# Patient Record
Sex: MALE | Race: WHITE | NOT HISPANIC OR LATINO | Employment: OTHER | URBAN - NONMETROPOLITAN AREA
[De-identification: names, ages, dates, MRNs, and addresses within clinical notes are randomized per-mention and may not be internally consistent; named-entity substitution may affect disease eponyms.]

---

## 2021-12-16 ENCOUNTER — ANESTHESIA EVENT (OUTPATIENT)
Dept: PERIOP | Facility: HOSPITAL | Age: 86
End: 2021-12-16
Payer: MEDICARE

## 2021-12-16 RX ORDER — TAMSULOSIN HYDROCHLORIDE 0.4 MG/1
0.4 CAPSULE ORAL
COMMUNITY
End: 2022-06-11

## 2021-12-22 ENCOUNTER — ANESTHESIA (OUTPATIENT)
Dept: PERIOP | Facility: HOSPITAL | Age: 86
End: 2021-12-22
Payer: MEDICARE

## 2021-12-22 ENCOUNTER — HOSPITAL ENCOUNTER (OUTPATIENT)
Facility: HOSPITAL | Age: 86
Setting detail: OUTPATIENT SURGERY
Discharge: HOME | End: 2021-12-23
Attending: OTOLARYNGOLOGY | Admitting: OTOLARYNGOLOGY
Payer: MEDICARE

## 2021-12-22 DIAGNOSIS — E89.0 S/P THYROIDECTOMY: Primary | ICD-10-CM

## 2021-12-22 DIAGNOSIS — R91.1 PULMONARY NODULE: ICD-10-CM

## 2021-12-22 DIAGNOSIS — E04.1 NONTOXIC SINGLE THYROID NODULE: ICD-10-CM

## 2021-12-22 PROBLEM — N40.0 BPH (BENIGN PROSTATIC HYPERPLASIA): Status: ACTIVE | Noted: 2021-12-22

## 2021-12-22 LAB
ANION GAP SERPL CALCULATED.3IONS-SCNC: 10 MMOL/L (ref 4–13)
BASOPHILS # BLD MANUAL: 0 THOUSAND/UL (ref 0–0.1)
BASOPHILS NFR MAR MANUAL: 0 % (ref 0–1)
BUN SERPL-MCNC: 17 MG/DL (ref 5–25)
CALCIUM SERPL-MCNC: 8.4 MG/DL (ref 8.3–10.1)
CALCIUM SERPL-MCNC: 8.6 MG/DL (ref 8.3–10.1)
CHLORIDE SERPL-SCNC: 102 MMOL/L (ref 100–108)
CO2 SERPL-SCNC: 25 MMOL/L (ref 21–32)
CREAT SERPL-MCNC: 1.11 MG/DL (ref 0.6–1.3)
EOSINOPHIL # BLD MANUAL: 0 THOUSAND/UL (ref 0–0.4)
EOSINOPHIL NFR BLD MANUAL: 0 % (ref 0–6)
ERYTHROCYTE [DISTWIDTH] IN BLOOD BY AUTOMATED COUNT: 14.4 % (ref 11.6–15.1)
GFR SERPL CREATININE-BSD FRML MDRD: 57 ML/MIN/1.73SQ M
GLUCOSE P FAST SERPL-MCNC: 169 MG/DL (ref 65–99)
GLUCOSE SERPL-MCNC: 169 MG/DL (ref 65–140)
HCT VFR BLD AUTO: 39.6 % (ref 36.5–49.3)
HGB BLD-MCNC: 13.5 G/DL (ref 12–17)
LYMPHOCYTES # BLD AUTO: 0.19 THOUSAND/UL (ref 0.6–4.47)
LYMPHOCYTES # BLD AUTO: 2 % (ref 14–44)
MCH RBC QN AUTO: 30.4 PG (ref 26.8–34.3)
MCHC RBC AUTO-ENTMCNC: 34.1 G/DL (ref 31.4–37.4)
MCV RBC AUTO: 89 FL (ref 82–98)
MONOCYTES # BLD AUTO: 0 THOUSAND/UL (ref 0–1.22)
MONOCYTES NFR BLD: 0 % (ref 4–12)
NEUTROPHILS # BLD MANUAL: 9.55 THOUSAND/UL (ref 1.85–7.62)
NEUTS SEG NFR BLD AUTO: 98 % (ref 43–75)
PLATELET # BLD AUTO: 181 THOUSANDS/UL (ref 149–390)
PLATELET BLD QL SMEAR: ADEQUATE
PMV BLD AUTO: 9.8 FL (ref 8.9–12.7)
POTASSIUM SERPL-SCNC: 4.5 MMOL/L (ref 3.5–5.3)
RBC # BLD AUTO: 4.44 MILLION/UL (ref 3.88–5.62)
RBC MORPH BLD: NORMAL
SODIUM SERPL-SCNC: 137 MMOL/L (ref 136–145)
WBC # BLD AUTO: 9.74 THOUSAND/UL (ref 4.31–10.16)

## 2021-12-22 PROCEDURE — 85007 BL SMEAR W/DIFF WBC COUNT: CPT | Performed by: INTERNAL MEDICINE

## 2021-12-22 PROCEDURE — 85027 COMPLETE CBC AUTOMATED: CPT | Performed by: INTERNAL MEDICINE

## 2021-12-22 PROCEDURE — 88331 PATH CONSLTJ SURG 1 BLK 1SPC: CPT | Performed by: PATHOLOGY

## 2021-12-22 PROCEDURE — 88307 TISSUE EXAM BY PATHOLOGIST: CPT | Performed by: PATHOLOGY

## 2021-12-22 PROCEDURE — 80048 BASIC METABOLIC PNL TOTAL CA: CPT | Performed by: INTERNAL MEDICINE

## 2021-12-22 PROCEDURE — 82310 ASSAY OF CALCIUM: CPT | Performed by: OTOLARYNGOLOGY

## 2021-12-22 PROCEDURE — NC001 PR NO CHARGE

## 2021-12-22 RX ORDER — FENTANYL CITRATE 50 UG/ML
INJECTION, SOLUTION INTRAMUSCULAR; INTRAVENOUS AS NEEDED
Status: DISCONTINUED | OUTPATIENT
Start: 2021-12-22 | End: 2021-12-22

## 2021-12-22 RX ORDER — DEXAMETHASONE SODIUM PHOSPHATE 4 MG/ML
INJECTION, SOLUTION INTRA-ARTICULAR; INTRALESIONAL; INTRAMUSCULAR; INTRAVENOUS; SOFT TISSUE AS NEEDED
Status: DISCONTINUED | OUTPATIENT
Start: 2021-12-22 | End: 2021-12-22

## 2021-12-22 RX ORDER — ONDANSETRON 2 MG/ML
INJECTION INTRAMUSCULAR; INTRAVENOUS AS NEEDED
Status: DISCONTINUED | OUTPATIENT
Start: 2021-12-22 | End: 2021-12-22

## 2021-12-22 RX ORDER — MAGNESIUM HYDROXIDE 1200 MG/15ML
LIQUID ORAL AS NEEDED
Status: DISCONTINUED | OUTPATIENT
Start: 2021-12-22 | End: 2021-12-22 | Stop reason: HOSPADM

## 2021-12-22 RX ORDER — HYDROCODONE BITARTRATE AND ACETAMINOPHEN 5; 325 MG/1; MG/1
2 TABLET ORAL EVERY 6 HOURS PRN
Status: DISCONTINUED | OUTPATIENT
Start: 2021-12-22 | End: 2021-12-23 | Stop reason: HOSPADM

## 2021-12-22 RX ORDER — ONDANSETRON 2 MG/ML
4 INJECTION INTRAMUSCULAR; INTRAVENOUS EVERY 6 HOURS PRN
Status: DISCONTINUED | OUTPATIENT
Start: 2021-12-22 | End: 2021-12-23 | Stop reason: HOSPADM

## 2021-12-22 RX ORDER — DEXMEDETOMIDINE HYDROCHLORIDE 100 UG/ML
INJECTION, SOLUTION INTRAVENOUS AS NEEDED
Status: DISCONTINUED | OUTPATIENT
Start: 2021-12-22 | End: 2021-12-22

## 2021-12-22 RX ORDER — FENTANYL CITRATE/PF 50 MCG/ML
25 SYRINGE (ML) INJECTION
Status: DISCONTINUED | OUTPATIENT
Start: 2021-12-22 | End: 2021-12-22

## 2021-12-22 RX ORDER — CEFAZOLIN SODIUM 2 G/50ML
SOLUTION INTRAVENOUS AS NEEDED
Status: DISCONTINUED | OUTPATIENT
Start: 2021-12-22 | End: 2021-12-22

## 2021-12-22 RX ORDER — EPHEDRINE SULFATE 50 MG/ML
INJECTION INTRAVENOUS AS NEEDED
Status: DISCONTINUED | OUTPATIENT
Start: 2021-12-22 | End: 2021-12-22

## 2021-12-22 RX ORDER — SODIUM CHLORIDE, SODIUM LACTATE, POTASSIUM CHLORIDE, CALCIUM CHLORIDE 600; 310; 30; 20 MG/100ML; MG/100ML; MG/100ML; MG/100ML
125 INJECTION, SOLUTION INTRAVENOUS CONTINUOUS
Status: DISCONTINUED | OUTPATIENT
Start: 2021-12-22 | End: 2021-12-22 | Stop reason: DRUGHIGH

## 2021-12-22 RX ORDER — TAMSULOSIN HYDROCHLORIDE 0.4 MG/1
0.4 CAPSULE ORAL
Status: DISCONTINUED | OUTPATIENT
Start: 2021-12-22 | End: 2021-12-23 | Stop reason: HOSPADM

## 2021-12-22 RX ORDER — SODIUM CHLORIDE, SODIUM LACTATE, POTASSIUM CHLORIDE, CALCIUM CHLORIDE 600; 310; 30; 20 MG/100ML; MG/100ML; MG/100ML; MG/100ML
75 INJECTION, SOLUTION INTRAVENOUS CONTINUOUS
Status: DISCONTINUED | OUTPATIENT
Start: 2021-12-22 | End: 2021-12-23 | Stop reason: HOSPADM

## 2021-12-22 RX ORDER — ONDANSETRON 2 MG/ML
4 INJECTION INTRAMUSCULAR; INTRAVENOUS ONCE AS NEEDED
Status: DISCONTINUED | OUTPATIENT
Start: 2021-12-22 | End: 2021-12-22

## 2021-12-22 RX ORDER — SUCCINYLCHOLINE/SOD CL,ISO/PF 100 MG/5ML
SYRINGE (ML) INTRAVENOUS AS NEEDED
Status: DISCONTINUED | OUTPATIENT
Start: 2021-12-22 | End: 2021-12-22

## 2021-12-22 RX ORDER — LIDOCAINE HYDROCHLORIDE 10 MG/ML
INJECTION, SOLUTION EPIDURAL; INFILTRATION; INTRACAUDAL; PERINEURAL AS NEEDED
Status: DISCONTINUED | OUTPATIENT
Start: 2021-12-22 | End: 2021-12-22

## 2021-12-22 RX ORDER — CEFAZOLIN SODIUM 2 G/50ML
2000 SOLUTION INTRAVENOUS EVERY 8 HOURS
Status: COMPLETED | OUTPATIENT
Start: 2021-12-22 | End: 2021-12-23

## 2021-12-22 RX ORDER — PROPOFOL 10 MG/ML
INJECTION, EMULSION INTRAVENOUS AS NEEDED
Status: DISCONTINUED | OUTPATIENT
Start: 2021-12-22 | End: 2021-12-22

## 2021-12-22 RX ORDER — METOCLOPRAMIDE HYDROCHLORIDE 5 MG/ML
10 INJECTION INTRAMUSCULAR; INTRAVENOUS ONCE AS NEEDED
Status: DISCONTINUED | OUTPATIENT
Start: 2021-12-22 | End: 2021-12-22

## 2021-12-22 RX ADMIN — PROPOFOL 110 MG: 10 INJECTION, EMULSION INTRAVENOUS at 07:34

## 2021-12-22 RX ADMIN — SODIUM CHLORIDE, SODIUM LACTATE, POTASSIUM CHLORIDE, AND CALCIUM CHLORIDE 75 ML/HR: .6; .31; .03; .02 INJECTION, SOLUTION INTRAVENOUS at 13:10

## 2021-12-22 RX ADMIN — EPHEDRINE SULFATE 10 MG: 50 INJECTION, SOLUTION INTRAVENOUS at 08:43

## 2021-12-22 RX ADMIN — LIDOCAINE HYDROCHLORIDE 50 MG: 10 INJECTION, SOLUTION EPIDURAL; INFILTRATION; INTRACAUDAL at 07:34

## 2021-12-22 RX ADMIN — DEXAMETHASONE SODIUM PHOSPHATE 8 MG: 4 INJECTION, SOLUTION INTRA-ARTICULAR; INTRALESIONAL; INTRAMUSCULAR; INTRAVENOUS; SOFT TISSUE at 07:34

## 2021-12-22 RX ADMIN — DEXMEDETOMIDINE HCL 4 MCG: 100 INJECTION INTRAVENOUS at 07:59

## 2021-12-22 RX ADMIN — CEFAZOLIN SODIUM 2000 MG: 2 SOLUTION INTRAVENOUS at 21:55

## 2021-12-22 RX ADMIN — CEFAZOLIN SODIUM 2000 MG: 2 SOLUTION INTRAVENOUS at 14:03

## 2021-12-22 RX ADMIN — DEXMEDETOMIDINE HCL 8 MCG: 100 INJECTION INTRAVENOUS at 08:00

## 2021-12-22 RX ADMIN — CEFAZOLIN SODIUM 2000 MG: 2 SOLUTION INTRAVENOUS at 07:44

## 2021-12-22 RX ADMIN — PROPOFOL 30 MG: 10 INJECTION, EMULSION INTRAVENOUS at 07:58

## 2021-12-22 RX ADMIN — SODIUM CHLORIDE, SODIUM LACTATE, POTASSIUM CHLORIDE, AND CALCIUM CHLORIDE: .6; .31; .03; .02 INJECTION, SOLUTION INTRAVENOUS at 07:30

## 2021-12-22 RX ADMIN — Medication 100 MG: at 07:34

## 2021-12-22 RX ADMIN — FENTANYL CITRATE 50 MCG: 50 INJECTION INTRAMUSCULAR; INTRAVENOUS at 07:48

## 2021-12-22 RX ADMIN — FENTANYL CITRATE 25 MCG: 50 INJECTION INTRAMUSCULAR; INTRAVENOUS at 07:34

## 2021-12-22 RX ADMIN — ONDANSETRON 4 MG: 2 INJECTION INTRAMUSCULAR; INTRAVENOUS at 07:34

## 2021-12-22 RX ADMIN — FENTANYL CITRATE 25 MCG: 50 INJECTION INTRAMUSCULAR; INTRAVENOUS at 07:46

## 2021-12-22 RX ADMIN — PROPOFOL 20 MG: 10 INJECTION, EMULSION INTRAVENOUS at 07:48

## 2021-12-23 VITALS
OXYGEN SATURATION: 99 % | HEART RATE: 70 BPM | TEMPERATURE: 97.6 F | BODY MASS INDEX: 27.49 KG/M2 | DIASTOLIC BLOOD PRESSURE: 90 MMHG | HEIGHT: 65 IN | SYSTOLIC BLOOD PRESSURE: 164 MMHG | RESPIRATION RATE: 18 BRPM | WEIGHT: 165 LBS

## 2021-12-23 LAB — CALCIUM SERPL-MCNC: 8.4 MG/DL (ref 8.3–10.1)

## 2021-12-23 PROCEDURE — 99204 OFFICE O/P NEW MOD 45 MIN: CPT | Performed by: INTERNAL MEDICINE

## 2021-12-23 RX ORDER — CEPHALEXIN 500 MG/1
500 CAPSULE ORAL EVERY 12 HOURS SCHEDULED
Qty: 20 CAPSULE | Refills: 0 | Status: SHIPPED | OUTPATIENT
Start: 2021-12-23 | End: 2022-01-02

## 2021-12-23 RX ADMIN — CEFAZOLIN SODIUM 2000 MG: 2 SOLUTION INTRAVENOUS at 05:56

## 2022-06-09 ENCOUNTER — HOSPITAL ENCOUNTER (EMERGENCY)
Facility: HOSPITAL | Age: 87
End: 2022-06-09
Attending: EMERGENCY MEDICINE
Payer: MEDICARE

## 2022-06-09 ENCOUNTER — APPOINTMENT (EMERGENCY)
Dept: RADIOLOGY | Facility: HOSPITAL | Age: 87
End: 2022-06-09
Payer: MEDICARE

## 2022-06-09 ENCOUNTER — HOSPITAL ENCOUNTER (INPATIENT)
Facility: HOSPITAL | Age: 87
LOS: 2 days | Discharge: HOME | DRG: 183 | End: 2022-06-11
Attending: SURGERY | Admitting: SURGERY
Payer: MEDICARE

## 2022-06-09 ENCOUNTER — APPOINTMENT (EMERGENCY)
Dept: CT IMAGING | Facility: HOSPITAL | Age: 87
End: 2022-06-09
Payer: MEDICARE

## 2022-06-09 VITALS
DIASTOLIC BLOOD PRESSURE: 58 MMHG | SYSTOLIC BLOOD PRESSURE: 97 MMHG | RESPIRATION RATE: 18 BRPM | HEART RATE: 60 BPM | OXYGEN SATURATION: 95 % | TEMPERATURE: 97.7 F
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
## 2022-06-09 DIAGNOSIS — S02.2XXB OPEN DISPLACED FRACTURE OF NASAL BONE, INITIAL ENCOUNTER: Primary | ICD-10-CM

## 2022-06-09 DIAGNOSIS — S22.41XA CLOSED FRACTURE OF MULTIPLE RIBS OF RIGHT SIDE, INITIAL ENCOUNTER: ICD-10-CM

## 2022-06-09 DIAGNOSIS — F10.929 ALCOHOL INTOXICATION (HCC): ICD-10-CM

## 2022-06-09 DIAGNOSIS — S61.019A THUMB LACERATION: ICD-10-CM

## 2022-06-09 DIAGNOSIS — R31.9 HEMATURIA, UNSPECIFIED TYPE: ICD-10-CM

## 2022-06-09 DIAGNOSIS — S02.2XXA CLOSED FRACTURE OF NASAL BONE, INITIAL ENCOUNTER: Primary | ICD-10-CM

## 2022-06-09 DIAGNOSIS — N40.0 BENIGN PROSTATIC HYPERPLASIA WITHOUT LOWER URINARY TRACT SYMPTOMS: ICD-10-CM

## 2022-06-09 LAB
2HR DELTA HS TROPONIN: 0 NG/L
ALBUMIN SERPL BCP-MCNC: 3 G/DL (ref 3.5–5)
ALP SERPL-CCNC: 55 U/L (ref 46–116)
ALT SERPL W P-5'-P-CCNC: 18 U/L (ref 12–78)
ANION GAP SERPL CALCULATED.3IONS-SCNC: 9 MMOL/L (ref 4–13)
APTT PPP: 27 SECONDS (ref 23–37)
AST SERPL W P-5'-P-CCNC: 20 U/L (ref 5–45)
BASOPHILS # BLD AUTO: 0.05 THOUSANDS/ΜL (ref 0–0.1)
BASOPHILS NFR BLD AUTO: 1 % (ref 0–1)
BILIRUB SERPL-MCNC: 0.43 MG/DL (ref 0.2–1)
BUN SERPL-MCNC: 11 MG/DL (ref 5–25)
CALCIUM ALBUM COR SERPL-MCNC: 8.2 MG/DL (ref 8.3–10.1)
CALCIUM SERPL-MCNC: 7.4 MG/DL (ref 8.3–10.1)
CARDIAC TROPONIN I PNL SERPL HS: 7 NG/L
CARDIAC TROPONIN I PNL SERPL HS: 7 NG/L
CHLORIDE SERPL-SCNC: 96 MMOL/L (ref 100–108)
CO2 SERPL-SCNC: 21 MMOL/L (ref 21–32)
CREAT SERPL-MCNC: 0.96 MG/DL (ref 0.6–1.3)
EOSINOPHIL # BLD AUTO: 0.14 THOUSAND/ΜL (ref 0–0.61)
EOSINOPHIL NFR BLD AUTO: 2 % (ref 0–6)
ERYTHROCYTE [DISTWIDTH] IN BLOOD BY AUTOMATED COUNT: 14.1 % (ref 11.6–15.1)
ETHANOL SERPL-MCNC: 182 MG/DL (ref 0–3)
GFR SERPL CREATININE-BSD FRML MDRD: 68 ML/MIN/1.73SQ M
GLUCOSE SERPL-MCNC: 102 MG/DL (ref 65–140)
HCT VFR BLD AUTO: 38.2 % (ref 36.5–49.3)
HGB BLD-MCNC: 13 G/DL (ref 12–17)
IMM GRANULOCYTES # BLD AUTO: 0.08 THOUSAND/UL (ref 0–0.2)
IMM GRANULOCYTES NFR BLD AUTO: 1 % (ref 0–2)
INR PPP: 1.01 (ref 0.84–1.19)
LYMPHOCYTES # BLD AUTO: 1.96 THOUSANDS/ΜL (ref 0.6–4.47)
LYMPHOCYTES NFR BLD AUTO: 26 % (ref 14–44)
MAGNESIUM SERPL-MCNC: 1.7 MG/DL (ref 1.6–2.6)
MCH RBC QN AUTO: 30.7 PG (ref 26.8–34.3)
MCHC RBC AUTO-ENTMCNC: 34 G/DL (ref 31.4–37.4)
MCV RBC AUTO: 90 FL (ref 82–98)
MONOCYTES # BLD AUTO: 0.53 THOUSAND/ΜL (ref 0.17–1.22)
MONOCYTES NFR BLD AUTO: 7 % (ref 4–12)
NEUTROPHILS # BLD AUTO: 4.88 THOUSANDS/ΜL (ref 1.85–7.62)
NEUTS SEG NFR BLD AUTO: 63 % (ref 43–75)
NRBC BLD AUTO-RTO: 0 /100 WBCS
PLATELET # BLD AUTO: 199 THOUSANDS/UL (ref 149–390)
PMV BLD AUTO: 9.5 FL (ref 8.9–12.7)
POTASSIUM SERPL-SCNC: 3.8 MMOL/L (ref 3.5–5.3)
PROT SERPL-MCNC: 5.6 G/DL (ref 6.4–8.2)
PROTHROMBIN TIME: 13.2 SECONDS (ref 11.6–14.5)
RBC # BLD AUTO: 4.24 MILLION/UL (ref 3.88–5.62)
SODIUM SERPL-SCNC: 126 MMOL/L (ref 136–145)
TSH SERPL DL<=0.05 MIU/L-ACNC: 0.87 UIU/ML (ref 0.45–4.5)
WBC # BLD AUTO: 7.64 THOUSAND/UL (ref 4.31–10.16)

## 2022-06-09 PROCEDURE — 96374 THER/PROPH/DIAG INJ IV PUSH: CPT

## 2022-06-09 PROCEDURE — 72170 X-RAY EXAM OF PELVIS: CPT

## 2022-06-09 PROCEDURE — 99285 EMERGENCY DEPT VISIT HI MDM: CPT

## 2022-06-09 PROCEDURE — 72125 CT NECK SPINE W/O DYE: CPT

## 2022-06-09 PROCEDURE — 12011 RPR F/E/E/N/L/M 2.5 CM/<: CPT | Performed by: PHYSICIAN ASSISTANT

## 2022-06-09 PROCEDURE — 85025 COMPLETE CBC W/AUTO DIFF WBC: CPT | Performed by: PHYSICIAN ASSISTANT

## 2022-06-09 PROCEDURE — G1004 CDSM NDSC: HCPCS

## 2022-06-09 PROCEDURE — 1123F ACP DISCUSS/DSCN MKR DOCD: CPT | Performed by: PHYSICIAN ASSISTANT

## 2022-06-09 PROCEDURE — 99222 1ST HOSP IP/OBS MODERATE 55: CPT | Performed by: SURGERY

## 2022-06-09 PROCEDURE — 73130 X-RAY EXAM OF HAND: CPT

## 2022-06-09 PROCEDURE — 12002 RPR S/N/AX/GEN/TRNK2.6-7.5CM: CPT | Performed by: PHYSICIAN ASSISTANT

## 2022-06-09 PROCEDURE — 93005 ELECTROCARDIOGRAM TRACING: CPT

## 2022-06-09 PROCEDURE — 80053 COMPREHEN METABOLIC PANEL: CPT | Performed by: PHYSICIAN ASSISTANT

## 2022-06-09 PROCEDURE — 85610 PROTHROMBIN TIME: CPT | Performed by: PHYSICIAN ASSISTANT

## 2022-06-09 PROCEDURE — 1124F ACP DISCUSS-NO DSCNMKR DOCD: CPT | Performed by: PHYSICIAN ASSISTANT

## 2022-06-09 PROCEDURE — 96361 HYDRATE IV INFUSION ADD-ON: CPT

## 2022-06-09 PROCEDURE — 85730 THROMBOPLASTIN TIME PARTIAL: CPT | Performed by: PHYSICIAN ASSISTANT

## 2022-06-09 PROCEDURE — 71250 CT THORAX DX C-: CPT

## 2022-06-09 PROCEDURE — 84443 ASSAY THYROID STIM HORMONE: CPT | Performed by: PHYSICIAN ASSISTANT

## 2022-06-09 PROCEDURE — 70450 CT HEAD/BRAIN W/O DYE: CPT

## 2022-06-09 PROCEDURE — 70486 CT MAXILLOFACIAL W/O DYE: CPT

## 2022-06-09 PROCEDURE — 99285 EMERGENCY DEPT VISIT HI MDM: CPT | Performed by: PHYSICIAN ASSISTANT

## 2022-06-09 PROCEDURE — 84484 ASSAY OF TROPONIN QUANT: CPT | Performed by: PHYSICIAN ASSISTANT

## 2022-06-09 PROCEDURE — 82077 ASSAY SPEC XCP UR&BREATH IA: CPT | Performed by: PHYSICIAN ASSISTANT

## 2022-06-09 PROCEDURE — 74176 CT ABD & PELVIS W/O CONTRAST: CPT

## 2022-06-09 PROCEDURE — 90715 TDAP VACCINE 7 YRS/> IM: CPT | Performed by: PHYSICIAN ASSISTANT

## 2022-06-09 PROCEDURE — 83735 ASSAY OF MAGNESIUM: CPT | Performed by: PHYSICIAN ASSISTANT

## 2022-06-09 PROCEDURE — 71045 X-RAY EXAM CHEST 1 VIEW: CPT

## 2022-06-09 PROCEDURE — 90471 IMMUNIZATION ADMIN: CPT

## 2022-06-09 PROCEDURE — 36415 COLL VENOUS BLD VENIPUNCTURE: CPT | Performed by: PHYSICIAN ASSISTANT

## 2022-06-09 RX ORDER — FOLIC ACID 1 MG/1
1 TABLET ORAL DAILY
Status: DISCONTINUED | OUTPATIENT
Start: 2022-06-10 | End: 2022-06-11 | Stop reason: HOSPADM

## 2022-06-09 RX ORDER — GABAPENTIN 100 MG/1
100 CAPSULE ORAL
Status: DISCONTINUED | OUTPATIENT
Start: 2022-06-09 | End: 2022-06-11 | Stop reason: HOSPADM

## 2022-06-09 RX ORDER — LIDOCAINE 50 MG/G
1 PATCH TOPICAL DAILY
Status: DISCONTINUED | OUTPATIENT
Start: 2022-06-10 | End: 2022-06-11 | Stop reason: HOSPADM

## 2022-06-09 RX ORDER — BACITRACIN, NEOMYCIN, POLYMYXIN B 400; 3.5; 5 [USP'U]/G; MG/G; [USP'U]/G
1 OINTMENT TOPICAL ONCE
Status: COMPLETED | OUTPATIENT
Start: 2022-06-09 | End: 2022-06-09

## 2022-06-09 RX ORDER — ACETAMINOPHEN 325 MG/1
975 TABLET ORAL EVERY 8 HOURS SCHEDULED
Status: DISCONTINUED | OUTPATIENT
Start: 2022-06-09 | End: 2022-06-11 | Stop reason: HOSPADM

## 2022-06-09 RX ORDER — LORAZEPAM 2 MG/ML
1 INJECTION INTRAMUSCULAR ONCE
Status: COMPLETED | OUTPATIENT
Start: 2022-06-09 | End: 2022-06-09

## 2022-06-09 RX ORDER — SODIUM CHLORIDE 9 MG/ML
125 INJECTION, SOLUTION INTRAVENOUS CONTINUOUS
Status: DISCONTINUED | OUTPATIENT
Start: 2022-06-09 | End: 2022-06-09 | Stop reason: HOSPADM

## 2022-06-09 RX ORDER — OXYCODONE HYDROCHLORIDE 5 MG/1
5 TABLET ORAL EVERY 6 HOURS PRN
Status: DISCONTINUED | OUTPATIENT
Start: 2022-06-09 | End: 2022-06-10

## 2022-06-09 RX ORDER — HYDROMORPHONE HCL IN WATER/PF 6 MG/30 ML
0.2 PATIENT CONTROLLED ANALGESIA SYRINGE INTRAVENOUS EVERY 4 HOURS PRN
Status: DISCONTINUED | OUTPATIENT
Start: 2022-06-09 | End: 2022-06-10

## 2022-06-09 RX ORDER — LIDOCAINE HYDROCHLORIDE 10 MG/ML
10 INJECTION, SOLUTION EPIDURAL; INFILTRATION; INTRACAUDAL; PERINEURAL ONCE
Status: COMPLETED | OUTPATIENT
Start: 2022-06-09 | End: 2022-06-09

## 2022-06-09 RX ORDER — LANOLIN ALCOHOL/MO/W.PET/CERES
100 CREAM (GRAM) TOPICAL DAILY
Status: DISCONTINUED | OUTPATIENT
Start: 2022-06-10 | End: 2022-06-11 | Stop reason: HOSPADM

## 2022-06-09 RX ORDER — OXYCODONE HYDROCHLORIDE 5 MG/1
2.5 TABLET ORAL EVERY 6 HOURS PRN
Status: DISCONTINUED | OUTPATIENT
Start: 2022-06-09 | End: 2022-06-10

## 2022-06-09 RX ADMIN — LIDOCAINE HYDROCHLORIDE 10 ML: 10 INJECTION, SOLUTION EPIDURAL; INFILTRATION; INTRACAUDAL; PERINEURAL at 18:28

## 2022-06-09 RX ADMIN — SODIUM CHLORIDE 125 ML/HR: 0.9 INJECTION, SOLUTION INTRAVENOUS at 21:41

## 2022-06-09 RX ADMIN — BACITRACIN ZINC, NEOMYCIN, POLYMYXIN B 1 LARGE APPLICATION: 400; 3.5; 5 OINTMENT TOPICAL at 19:12

## 2022-06-09 RX ADMIN — SODIUM CHLORIDE 500 ML: 0.9 INJECTION, SOLUTION INTRAVENOUS at 19:12

## 2022-06-09 RX ADMIN — TETANUS TOXOID, REDUCED DIPHTHERIA TOXOID AND ACELLULAR PERTUSSIS VACCINE, ADSORBED 0.5 ML: 5; 2.5; 8; 8; 2.5 SUSPENSION INTRAMUSCULAR at 18:29

## 2022-06-09 RX ADMIN — LORAZEPAM 1 MG: 2 INJECTION INTRAMUSCULAR; INTRAVENOUS at 20:02

## 2022-06-09 NOTE — ED PROVIDER NOTES
Emergency Department Trauma Note  Edelmiro Kawasaki 80 y o  male MRN: [id number]  Unit/Bed#: ED 02/ED 02 Encounter: [id number]      Trauma Alert: Trauma Acuity: Trauma Evaluation  Model of Arrival: Mode of Arrival: BLS via    Trauma Team: Current Providers  Attending Provider: Jillian Williamson MD  Attending Provider: Moe Dean DO  Registered Nurse: Qamar Alfaro RN  Physician Assistant: Zarina Gomez PA-C  Registered Nurse: Rogelio Rosa RN  Consultants:     None      History of Present Illness     Chief Complaint:   Chief Complaint   Patient presents with    Fall     [place] and [place] and fell  Had abrasion on his nose and forehead  Denies thinners, meds, allergies, loc  HPI:  Edelmiro Kawasaki is a 80 y o  male who presents with fall  Mechanism:Details of Incident: patient had ETOH in his system and fell walking ot his car  Injury Date: 06/09/22 Injury Time: 1716 Injury Occurence Location - [address] [place], [place] [address]: forehead, faceial abrasion and on his right hand  Patient is a 51-year-old male, who presents by ambulance for the chief complaint of fall prior to arrival, multiple abrasions  Patient was at a bar, admits to drinking 6-7 beers and then left to drive home  Tripped and fell in the parking lot and fell on his right side, falling to the ground on the concrete  Patient has multiple abrasions to the face, hands  Patient was helped to his feet by other bystanders, EMS was called for further evaluation  Patient is obviously intoxicated denies any medication use or pain        History provided by:  EMS personnel  Fall  Mechanism of injury: fall    Injury location:  Head/neck and hand  Head/neck injury location:  Head  Hand injury location:  L hand  Arrived directly from scene: yes    Fall:     Fall occurred:  Tripped    Impact surface:  [place]    Point of impact:  Unable to specify    Entrapped after fall: no    Suspicion of alcohol use: yes    Suspicion of drug use: no Tetanus status:  Unknown  Prior to arrival data:     Patient ambulatory at scene: no      Blood loss:  Minimal    Responsiveness at scene:  Alert    Loss of consciousness: no      Amnesic to event: no      Breathing interventions:  None    IV access status:  None    IO access:  None    Fluids administered:  None    Cardiac interventions:  None    Medications administered:  None    Immobilization:  C-collar  Current pain details:     Pain quality:  Unable to specify    Pain Severity:  Unable to specify  Associated symptoms: no abdominal pain, no back pain, no chest pain, no difficulty breathing, no headaches, no loss of consciousness, no nausea, no neck pain and no vomiting      Review of Systems   Cardiovascular: Negative for chest pain  Gastrointestinal: Negative for abdominal pain, nausea and vomiting  Musculoskeletal: Negative for back pain and neck pain  Neurological: Negative for loss of consciousness and headaches  All other systems reviewed and are negative  Historical Information     Immunizations:   Immunization History   Administered Date(s) Administered    Tdap 06/09/2022       Past Medical History:   Diagnosis Date    Anemia     BPH (benign prostatic hyperplasia)     Cancer ([facility] [address] )     malignant neoplasm of thyroid gland    Gout     Hyperlipidemia     Pacemaker     Pt has d/t sick sinus syndrome    Pulmonary nodule     Sick sinus syndrome ([facility] )      History reviewed  No pertinent family history  Past Surgical History:   Procedure Laterality Date    CARDIAC PACEMAKER PLACEMENT      COLONOSCOPY      EGD      HEMORRHOID SURGERY      HERNIA REPAIR      inguinal hernia    RI THYROID LOBECTOMY,UNILAT Left 12/22/2021    Procedure: THYROIDECTOMY WITH ISTHMUSECTOMY; POSSIBLE TOTAL THYROIDECTOMY; FROZEN SECTION ANALYSIS; [facility] MONITOR SET-UP AND USE;   Surgeon: Sabiha Cummings MD;  Location:  MAIN OR;  Service: ENT    ROTATOR CUFF REPAIR Right      Social History     Tobacco Use    Smoking status: Never Smoker    Smokeless tobacco: Never Used   Vaping Use    Vaping Use: Never used   Substance Use Topics    Alcohol use: Yes     Alcohol/week: 1 0 standard drink     Types: 1 Glasses of wine per week     Comment: Pt has an occasional glass of wine    Drug use: Not Currently     E-Cigarette/Vaping    E-Cigarette Use Never User      E-Cigarette/Vaping Substances       Family History: non-contributory    Meds/Allergies   Prior to Admission Medications   Prescriptions Last Dose Informant Patient Reported? Taking?   tamsulosin (Flomax) 0 4 mg   Yes No   Sig: Take 0 4 mg by mouth daily with dinner      Facility-Administered Medications: None       No Known Allergies    PHYSICAL EXAM    PE limited by: etoh use      Objective   Vitals:   First set: Temperature: 97 7 °F (36 5 °C) (06/09/22 1804)  Pulse: 62 (06/09/22 1804)  Respirations: 18 (06/09/22 1804)  Blood Pressure: 117/69 (06/09/22 1804)  SpO2: 96 % (06/09/22 1845)    Primary Survey:   (A) Airway: good  (B) Breathing: good   (C) Circulation: Pulses:   normal  (D) Disabliity:  GCS Total:  15  (E) Expose:  Completed    Secondary Survey: (Click on Physical Exam tab above)  Physical Exam  Vitals and nursing note reviewed  Constitutional:       Appearance: He is well-developed  Comments: Patient is intoxicated, pleasant and cooperative   HENT:      Head: Normocephalic  Abrasion, contusion and laceration present  Nose: Nasal deformity, signs of injury and nasal tenderness present  Right Nostril: No septal hematoma  Left Nostril: No septal hematoma  Eyes:      Extraocular Movements: Extraocular movements intact  Conjunctiva/sclera: Conjunctivae normal       Pupils: Pupils are equal, round, and reactive to light  Cardiovascular:      Rate and Rhythm: Normal rate and regular rhythm  Heart sounds: No murmur heard  Pulmonary:      Effort: Pulmonary effort is normal  No respiratory distress        Breath sounds: Normal breath sounds  Abdominal:      Palpations: Abdomen is soft  Tenderness: There is no abdominal tenderness  Musculoskeletal:      Right wrist: Normal range of motion  Left wrist: Normal range of motion  Hands:       Cervical back: Normal range of motion and neck supple  No tenderness  No spinous process tenderness or muscular tenderness  Legs:    Skin:     General: Skin is warm and dry  Neurological:      Mental Status: He is alert and oriented to person, place, and time  Cervical spine cleared by clinical criteria? No (imaging required)      Invasive Devices  Report    Peripheral Intravenous Line  Duration           Peripheral IV 06/09/22 Left Forearm <1 day          Drain  Duration           Closed/Suction Drain Anterior;Right Neck 7 Fr  169 days                Lab Results:   Results Reviewed     Procedure Component Value Units Date/Time    TSH [[id number]]  (Normal) Collected: 06/09/22 1857    Lab Status: Final result Specimen: Blood from Arm, Left Updated: 06/09/22 1942     TSH 3RD GENERATON 0 866 uIU/mL     Narrative:      Patients undergoing fluorescein dye angiography may retain small amounts of fluorescein in the body for 48-72 hours post procedure  Samples containing fluorescein can produce falsely depressed TSH values  If the patient had this procedure,a specimen should be resubmitted post fluorescein clearance        HS Troponin I 2hr [[id number]]     Lab Status: No result Specimen: Blood     HS Troponin 0hr (reflex protocol) [[id number]]  (Normal) Collected: 06/09/22 1857    Lab Status: Final result Specimen: Blood from Arm, Left Updated: 06/09/22 1935     hs TnI 0hr 7 ng/L     Comprehensive metabolic panel [[id number]]  (Abnormal) Collected: 06/09/22 1857    Lab Status: Final result Specimen: Blood from Arm, Left Updated: 06/09/22 1935     Sodium 126 mmol/L      Potassium 3 8 mmol/L      Chloride 96 mmol/L      CO2 21 mmol/L      ANION GAP 9 mmol/L      BUN 11 mg/dL Creatinine 0 96 mg/dL      Glucose 102 mg/dL      Calcium 7 4 mg/dL      Corrected Calcium 8 2 mg/dL      AST 20 U/L      ALT 18 U/L      Alkaline Phosphatase 55 U/L      Total Protein 5 6 g/dL      Albumin 3 0 g/dL      Total Bilirubin 0 43 mg/dL      eGFR 68 ml/min/1 73sq m     Narrative:      [place] guidelines for Chronic Kidney Disease (CKD):     Stage 1 with normal or high GFR (GFR > 90 mL/min/1 73 square meters)    Stage 2 Mild CKD (GFR = 60-89 mL/min/1 73 square meters)    Stage 3A Moderate CKD (GFR = 45-59 mL/min/1 73 square meters)    Stage 3B Moderate CKD (GFR = 30-44 mL/min/1 73 square meters)    Stage 4 Severe CKD (GFR = 15-29 mL/min/1 73 square meters)    Stage 5 End Stage CKD (GFR <15 mL/min/1 73 square meters)  Note: GFR calculation is accurate only with a steady state creatinine    Magnesium [[id number]]  (Normal) Collected: 06/09/22 1857    Lab Status: Final result Specimen: Blood from Arm, Left Updated: 06/09/22 1935     Magnesium 1 7 mg/dL     Ethanol [[id number]]  (Abnormal) Collected: 06/09/22 1857    Lab Status: Final result Specimen: Blood from Arm, Left Updated: 06/09/22 1929     Ethanol Lvl 182 mg/dL     Protime-INR [[id number]]  (Normal) Collected: 06/09/22 1857    Lab Status: Final result Specimen: Blood from Arm, Left Updated: 06/09/22 1921     Protime 13 2 seconds      INR 1 01    APTT [[id number]]  (Normal) Collected: 06/09/22 1857    Lab Status: Final result Specimen: Blood from Arm, Left Updated: 06/09/22 1921     PTT 27 seconds     CBC and differential [[id number]] Collected: 06/09/22 1857    Lab Status: Final result Specimen: Blood from Arm, Left Updated: 06/09/22 1908     WBC 7 64 Thousand/uL      RBC 4 24 Million/uL      Hemoglobin 13 0 g/dL      Hematocrit 38 2 %      MCV 90 fL      MCH 30 7 pg      MCHC 34 0 g/dL      RDW 14 1 %      MPV 9 5 fL      Platelets 043 Thousands/uL      nRBC 0 /100 WBCs      Neutrophils Relative 63 %      Immat GRANS % 1 %      Lymphocytes Relative 26 %      Monocytes Relative 7 %      Eosinophils Relative 2 %      Basophils Relative 1 %      Neutrophils Absolute 4 88 Thousands/µL      Immature Grans Absolute 0 08 Thousand/uL      Lymphocytes Absolute 1 96 Thousands/µL      Monocytes Absolute 0 53 Thousand/µL      Eosinophils Absolute 0 14 Thousand/µL      Basophils Absolute 0 05 Thousands/µL     UA w Reflex to Microscopic w Reflex to Culture [[id number]]     Lab Status: No result Specimen: Urine     UA (URINE) with reflex to Scope [[id number]]     Lab Status: No result Specimen: Urine                  Imaging Studies:   Direct to CT: No  XR hand 3+ views LEFT   ED Interpretation by Tomás Shelley MD (06/09 1835)   Degenerative changes no fracture      XR Trauma pelvis ap only 1 or 2 vw   ED Interpretation by Tomás Shelley MD (06/09 1835)   No fracture      XR Trauma chest portable   ED Interpretation by Tomás Shelley MD (06/09 1835)   No pneumothorax      TRAUMA - CT head wo contrast   Final Result by Jenae Jenkins MD (06/09 1832)      No acute intracranial abnormality  Nasal bone fractures  See the CT facial bone report  Workstation performed: [id number]         TRAUMA - CT spine cervical wo contrast   Final Result by Jenae Jenkins MD (06/09 1837)      No cervical spine fracture or traumatic malalignment  Workstation performed: [id number]         TRAUMA - CT facial bones wo contrast   Final Result by Jenae Jenkins MD (06/09 1835)      Mildly displaced acute bilateral nasal bone fractures  Workstation performed: [id number]         CT chest abdomen pelvis wo contrast   Final Result by Jenae Jenkins MD (06/09 1851)      Acute mildly displaced fractures of the anterolateral right 4th through 7th ribs               I personally discussed this study with Lonna Castleman on 6/9/2022 at 6:51 PM  Workstation performed: [id number]               Procedures  Laceration repair    Date/Time: 6/9/2022 7:54 PM  Performed by: Courtney Ramirez PA-C  Authorized by: Courtney Ramirez PA-C   Consent: Verbal consent obtained  Risks and benefits: risks, benefits and alternatives were discussed  Consent given by: patient  Patient identity confirmed: verbally with patient  Body area: head/neck  Location details: nose  Laceration length: 1 cm  Foreign bodies: no foreign bodies  Tendon involvement: none  Nerve involvement: none  Anesthesia: local infiltration    Anesthesia:  Local Anesthetic: lidocaine 1% without epinephrine    Wound Dehiscence:  Superficial Wound Dehiscence: simple closure      Procedure Details:  Preparation: Patient was prepped and draped in the usual sterile fashion  Irrigation solution: saline  Irrigation method: tap  Amount of cleaning: standard  Debridement: none  Degree of undermining: none  Wound skin closure material used: 6-0 gut   Technique: simple and running  Approximation: close  Approximation difficulty: simple  Patient tolerance: patient tolerated the procedure well with no immediate complications    Laceration repair    Date/Time: 6/9/2022 7:55 PM  Performed by: Courtney Ramirez PA-C  Authorized by: Courtney Ramirez PA-C   Consent: Verbal consent obtained    Risks and benefits: risks, benefits and alternatives were discussed  Consent given by: patient  Patient identity confirmed: verbally with patient  Body area: upper extremity  Location details: left thumb  Laceration length: 3 cm  Foreign bodies: no foreign bodies  Tendon involvement: none  Nerve involvement: none  Anesthesia: local infiltration    Anesthesia:  Anesthetic total: 5 mL      Procedure Details:  Irrigation solution: saline  Irrigation method: syringe  Debridement: none  Degree of undermining: none  Skin closure: Ethilon  Number of sutures: 5  Technique: simple  Approximation: close  Approximation difficulty: simple  Dressing: 4x4 sterile gauze, tube gauze and non-adhesive packing strip  Patient tolerance: patient tolerated the procedure well with no immediate complications               ED Course  ED Course as of 06/09/22 1958   Thu Jun 09, 2022   1851 4-7 rib fractures   1855 Cervical Collar Clearance: The patient had a CT scan of the cervical spine demonstrating no acute injury  On exam, the patient had no midline point tenderness or paresthesias/numbness/weakness in the extremities  The patient had full range of motion (was then able to flex, extend, and rotate head laterally) without pain  There were no distracting injuries and the patient was not intoxicated  The patient's cervical spine was cleared radiologically and clinically  Cervical collar removed at this time  [facility] [place]  6/9/2022 6:55 PM                MDM  Number of Diagnoses or Management Options  Alcohol intoxication ([facility] [address] )  Closed fracture of multiple ribs of right side, initial encounter  Fall, initial encounter  Open displaced fracture of nasal bone, initial encounter  Thumb laceration  Diagnosis management comments: Patient is clinically intoxicated, cooperative  Lacerations were repaired  The patient was found have multiple mildly displaced rib fractures on right side, nasal bone fracture  Patient unable to give clear history of medication use  Attempted to call son Aquilino Benites unable to get through, left voicemail with call back  Patient requiring higher level of care, monitoring due to multiple rib fractures and advanced age  Trauma Service was then consulted and accepted transfer to Trauma Service SO[facility]         Amount and/or Complexity of Data Reviewed  Clinical lab tests: ordered and reviewed  Tests in the radiology section of CPT®: ordered and reviewed  Decide to obtain previous medical records or to obtain history from someone other than the patient: yes  Obtain history from someone other than the patient: yes  Review and summarize past medical records: yes  Discuss the patient with other providers: yes  Independent visualization of images, tracings, or specimens: yes    Risk of Complications, Morbidity, and/or Mortality  Presenting problems: high  Diagnostic procedures: high  Management options: high    Patient Progress  Patient progress: stable          Disposition  [facility] Transfer: No  Final diagnoses:   Fall, initial encounter   Closed fracture of multiple ribs of right side, initial encounter   Thumb laceration   Open displaced fracture of nasal bone, initial encounter   Alcohol intoxication ([facility] [address] )     Time reflects when diagnosis was documented in both MDM as applicable and the Disposition within this note     Time User Action Codes Description Comment    6/9/2022  6:56 PM Tess Number Add Safjd Ruizadle  OAHD] Fall, initial encounter     6/9/2022  6:56 PM Tess Number Add [[id number] [id number]] Closed fracture of multiple ribs of right side, initial encounter     6/9/2022  6:56 PM Tess Number Add [[id number]] Thumb laceration     6/9/2022  6:56 PM Tess Number Add [S02  2XXB] Open displaced fracture of nasal bone, initial encounter     6/9/2022  6:56 PM Tess Number Add [[id number]] Alcohol intoxication ([facility] [address] )     6/9/2022  6:59 PM Francisco Robledo [V00  WSCD] Fall, initial encounter     6/9/2022  6:59 PM Tess Number Modify [S02  2XXB] Open displaced fracture of nasal bone, initial encounter       ED Disposition     ED Disposition   Transfer to Another Facility-In Network    Condition   --    Date/Time   Thu Jun 9, 2022  6:59 PM    Comment   Arya Chen should be transferred out to [facility] trauma   Follow-up Information    None       Patient's Medications   Discharge Prescriptions    No medications on file     No discharge procedures on file      PDMP Review     None          ED Provider  Electronically Signed by         Raj Yanez PA-C  06/09/22 1959

## 2022-06-09 NOTE — EMTALA/ACUTE CARE TRANSFER
[address] [facility]  [address]  [facility] [address]  Dept: [phone]      EMTALA TRANSFER CONSENT    NAME Raven Samuel                                         1930                              MRN [id number]    I have been informed of my rights regarding examination, treatment, and transfer   by Dr Diana Rob, DO    Benefits:      Risks:        Consent for Transfer:  I acknowledge that my medical condition has been evaluated and explained to me by the emergency department physician or other qualified medical person and/or my attending physician, who has recommended that I be transferred to the service of    at    The above potential benefits of such transfer, the potential risks associated with such transfer, and the probable risks of not being transferred have been explained to me, and I fully understand them  The doctor has explained that, in my case, the benefits of transfer outweigh the risks  I agree to be transferred  I authorize the performance of emergency medical procedures and treatments upon me in both transit and upon arrival at the receiving facility  Additionally, I authorize the release of any and all medical records to the receiving facility and request they be transported with me, if possible  I understand that the safest mode of transportation during a medical emergency is an ambulance and that the Hospital advocates the use of this mode of transport  Risks of traveling to the receiving facility by car, including absence of medical control, life sustaining equipment, such as oxygen, and medical personnel has been explained to me and I fully understand them  (OSEAS CORRECT BOX BELOW)  [  ]  I consent to the stated transfer and to be transported by ambulance/helicopter  [  ]  I consent to the stated transfer, but refuse transportation by ambulance and accept full responsibility for my transportation by car    I understand the risks of non-ambulance transfers and I exonerate the Hospital and its staff from any deterioration in my condition that results from this refusal     X___________________________________________    DATE  22  TIME________  Signature of patient or legally responsible individual signing on patient behalf           RELATIONSHIP TO PATIENT_________________________          Provider Certification    NAME Chencho TORREZ 1930                              MRN [id number]    A medical screening exam was performed on the above named patient  Based on the examination:    Condition Necessitating Transfer The primary encounter diagnosis was Open displaced fracture of nasal bone, initial encounter  Diagnoses of Fall, initial encounter, Closed fracture of multiple ribs of right side, initial encounter, Thumb laceration, and Alcohol intoxication ([facility] [address] ) were also pertinent to this visit  Patient Condition:      Reason for Transfer:      Transfer Requirements: Facility     · Space available and qualified personnel available for treatment as acknowledged by    · Agreed to accept transfer and to provide appropriate medical treatment as acknowledged by          · Appropriate medical records of the examination and treatment of the patient are provided at the time of transfer   [address] [facility], [address] _______  · Transfer will be performed by qualified personnel from    and appropriate transfer equipment as required, including the use of necessary and appropriate life support measures      Provider Certification: I have examined the patient and explained the following risks and benefits of being transferred/refusing transfer to the patient/family:         Based on these reasonable risks and benefits to the patient and/or the unborn child(ok), and based upon the information available at the time of the patients examination, I certify that the medical benefits reasonably to be expected from the provision of appropriate medical treatments at another medical facility outweigh the increasing risks, if any, to the individuals medical condition, and in the case of labor to the unborn child, from effecting the transfer      X____________________________________________ DATE 06/09/22        TIME_______      ORIGINAL - SEND TO MEDICAL RECORDS   COPY - SEND WITH PATIENT DURING TRANSFER

## 2022-06-09 NOTE — TRAUMA DOCUMENTATION
Patient to be transferred to [facility] ED via [facility] @ 2100  Accepting physician Dr Angeal Helms, number for report [phone]

## 2022-06-09 NOTE — ED ATTENDING ATTESTATION
6/9/2022  ILatesha MD, saw and evaluated the patient  I have discussed the patient with the resident/non-physician practitioner and agree with the resident's/non-physician practitioner's findings, Plan of Care, and MDM as documented in the resident's/non-physician practitioner's note, except where noted  All available labs and Radiology studies were reviewed  I was present for key portions of any procedure(s) performed by the resident/non-physician practitioner and I was immediately available to provide assistance  At this point I agree with the current assessment done in the Emergency Department  I have conducted an independent evaluation of this patient a history and physical is as follows:    ED Course     Tripped and fell forward  Suffered a laceration to the left hand and nose  Tetanus status unknown  No loss conscious  No nausea or vomiting  No chest pain shortness of breath  No abdominal pain or back pain  On exam patient is awake alert  GCS of 15  Neck is nontender palpation midline  Laceration noted to the bridge of the nose  Lungs are clear to auscultation  Chest is nontender palpation  Avulsion laceration to the base of the left thumb on the thenar side  Lower extremities are full range of motion  Nontender palpation  Abdomen soft nontender good bowel sounds        Critical Care Time  Procedures

## 2022-06-09 NOTE — ED NOTES
1 Suture to pt nose and 5 sutures to left base of thumb by Woodrow Disla Pt sammi well       [address] Jasmyne RN  06/09/22 [id number]

## 2022-06-10 ENCOUNTER — APPOINTMENT (INPATIENT)
Dept: RADIOLOGY | Facility: HOSPITAL | Age: 87
DRG: 183 | End: 2022-06-10
Payer: MEDICARE

## 2022-06-10 PROBLEM — F10.10 ALCOHOL ABUSE: Status: ACTIVE | Noted: 2022-06-10

## 2022-06-10 PROBLEM — S02.2XXA CLOSED FRACTURE OF NASAL BONE: Status: ACTIVE | Noted: 2022-06-10

## 2022-06-10 PROBLEM — Z86.39 HISTORY OF HYPOTHYROIDISM: Status: ACTIVE | Noted: 2022-06-10
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
## 2022-06-10 PROBLEM — R31.9 HEMATURIA: Status: ACTIVE | Noted: 2022-06-10

## 2022-06-10 PROBLEM — S22.41XA CLOSED FRACTURE OF MULTIPLE RIBS OF RIGHT SIDE: Status: ACTIVE | Noted: 2022-06-10

## 2022-06-10 LAB
ABO GROUP BLD: NORMAL
ANION GAP SERPL CALCULATED.3IONS-SCNC: 10 MMOL/L (ref 4–13)
ATRIAL RATE: 49 BPM
BASOPHILS # BLD AUTO: 0.04 THOUSANDS/ΜL (ref 0–0.1)
BASOPHILS NFR BLD AUTO: 1 % (ref 0–1)
BLD GP AB SCN SERPL QL: NEGATIVE
BUN SERPL-MCNC: 8 MG/DL (ref 5–25)
CALCIUM SERPL-MCNC: 8.1 MG/DL (ref 8.3–10.1)
CHLORIDE SERPL-SCNC: 98 MMOL/L (ref 100–108)
CO2 SERPL-SCNC: 21 MMOL/L (ref 21–32)
CREAT SERPL-MCNC: 0.84 MG/DL (ref 0.6–1.3)
EOSINOPHIL # BLD AUTO: 0.04 THOUSAND/ΜL (ref 0–0.61)
EOSINOPHIL NFR BLD AUTO: 1 % (ref 0–6)
ERYTHROCYTE [DISTWIDTH] IN BLOOD BY AUTOMATED COUNT: 13.9 % (ref 11.6–15.1)
GFR SERPL CREATININE-BSD FRML MDRD: 76 ML/MIN/1.73SQ M
GLUCOSE SERPL-MCNC: 99 MG/DL (ref 65–140)
HCT VFR BLD AUTO: 39.2 % (ref 36.5–49.3)
HGB BLD-MCNC: 13 G/DL (ref 12–17)
IMM GRANULOCYTES # BLD AUTO: 0.05 THOUSAND/UL (ref 0–0.2)
IMM GRANULOCYTES NFR BLD AUTO: 1 % (ref 0–2)
INR PPP: 1.01 (ref 0.84–1.19)
LYMPHOCYTES # BLD AUTO: 1.12 THOUSANDS/ΜL (ref 0.6–4.47)
LYMPHOCYTES NFR BLD AUTO: 13 % (ref 14–44)
MAGNESIUM SERPL-MCNC: 1.6 MG/DL (ref 1.6–2.6)
MCH RBC QN AUTO: 29.7 PG (ref 26.8–34.3)
MCHC RBC AUTO-ENTMCNC: 33.2 G/DL (ref 31.4–37.4)
MCV RBC AUTO: 90 FL (ref 82–98)
MONOCYTES # BLD AUTO: 0.49 THOUSAND/ΜL (ref 0.17–1.22)
MONOCYTES NFR BLD AUTO: 6 % (ref 4–12)
NEUTROPHILS # BLD AUTO: 7.1 THOUSANDS/ΜL (ref 1.85–7.62)
NEUTS SEG NFR BLD AUTO: 78 % (ref 43–75)
NRBC BLD AUTO-RTO: 0 /100 WBCS
P AXIS: 97 DEGREES
PHOSPHATE SERPL-MCNC: 2.5 MG/DL (ref 2.3–4.1)
PLATELET # BLD AUTO: 187 THOUSANDS/UL (ref 149–390)
PMV BLD AUTO: 9.5 FL (ref 8.9–12.7)
POTASSIUM SERPL-SCNC: 4 MMOL/L (ref 3.5–5.3)
PROTHROMBIN TIME: 12.9 SECONDS (ref 11.6–14.5)
QRS AXIS: -86 DEGREES
QRSD INTERVAL: 182 MS
QT INTERVAL: 490 MS
QTC INTERVAL: 493 MS
RBC # BLD AUTO: 4.37 MILLION/UL (ref 3.88–5.62)
RH BLD: POSITIVE
SODIUM SERPL-SCNC: 129 MMOL/L (ref 136–145)
SPECIMEN EXPIRATION DATE: NORMAL
T WAVE AXIS: 66 DEGREES
VENTRICULAR RATE: 61 BPM
WBC # BLD AUTO: 8.84 THOUSAND/UL (ref 4.31–10.16)

## 2022-06-10 PROCEDURE — 93010 ELECTROCARDIOGRAM REPORT: CPT | Performed by: INTERNAL MEDICINE

## 2022-06-10 PROCEDURE — 85610 PROTHROMBIN TIME: CPT | Performed by: STUDENT IN AN ORGANIZED HEALTH CARE EDUCATION/TRAINING PROGRAM

## 2022-06-10 PROCEDURE — 73130 X-RAY EXAM OF HAND: CPT

## 2022-06-10 PROCEDURE — 97163 PT EVAL HIGH COMPLEX 45 MIN: CPT

## 2022-06-10 PROCEDURE — 71046 X-RAY EXAM CHEST 2 VIEWS: CPT

## 2022-06-10 PROCEDURE — 84100 ASSAY OF PHOSPHORUS: CPT | Performed by: STUDENT IN AN ORGANIZED HEALTH CARE EDUCATION/TRAINING PROGRAM

## 2022-06-10 PROCEDURE — 86900 BLOOD TYPING SEROLOGIC ABO: CPT | Performed by: STUDENT IN AN ORGANIZED HEALTH CARE EDUCATION/TRAINING PROGRAM

## 2022-06-10 PROCEDURE — 80048 BASIC METABOLIC PNL TOTAL CA: CPT | Performed by: STUDENT IN AN ORGANIZED HEALTH CARE EDUCATION/TRAINING PROGRAM

## 2022-06-10 PROCEDURE — 85025 COMPLETE CBC W/AUTO DIFF WBC: CPT | Performed by: STUDENT IN AN ORGANIZED HEALTH CARE EDUCATION/TRAINING PROGRAM

## 2022-06-10 PROCEDURE — 97167 OT EVAL HIGH COMPLEX 60 MIN: CPT

## 2022-06-10 PROCEDURE — 99222 1ST HOSP IP/OBS MODERATE 55: CPT | Performed by: PHYSICIAN ASSISTANT

## 2022-06-10 PROCEDURE — 99232 SBSQ HOSP IP/OBS MODERATE 35: CPT | Performed by: EMERGENCY MEDICINE

## 2022-06-10 PROCEDURE — 99223 1ST HOSP IP/OBS HIGH 75: CPT | Performed by: INTERNAL MEDICINE

## 2022-06-10 PROCEDURE — 86901 BLOOD TYPING SEROLOGIC RH(D): CPT | Performed by: STUDENT IN AN ORGANIZED HEALTH CARE EDUCATION/TRAINING PROGRAM

## 2022-06-10 PROCEDURE — 86850 RBC ANTIBODY SCREEN: CPT | Performed by: STUDENT IN AN ORGANIZED HEALTH CARE EDUCATION/TRAINING PROGRAM

## 2022-06-10 PROCEDURE — 83735 ASSAY OF MAGNESIUM: CPT | Performed by: STUDENT IN AN ORGANIZED HEALTH CARE EDUCATION/TRAINING PROGRAM

## 2022-06-10 RX ORDER — ENOXAPARIN SODIUM 100 MG/ML
30 INJECTION SUBCUTANEOUS EVERY 12 HOURS SCHEDULED
Status: DISCONTINUED | OUTPATIENT
Start: 2022-06-11 | End: 2022-06-11 | Stop reason: HOSPADM

## 2022-06-10 RX ORDER — SODIUM CHLORIDE, SODIUM LACTATE, POTASSIUM CHLORIDE, CALCIUM CHLORIDE 600; 310; 30; 20 MG/100ML; MG/100ML; MG/100ML; MG/100ML
100 INJECTION, SOLUTION INTRAVENOUS CONTINUOUS
Status: DISCONTINUED | OUTPATIENT
Start: 2022-06-10 | End: 2022-06-10

## 2022-06-10 RX ORDER — TAMSULOSIN HYDROCHLORIDE 0.4 MG/1
0.4 CAPSULE ORAL
Status: DISCONTINUED | OUTPATIENT
Start: 2022-06-10 | End: 2022-06-11 | Stop reason: HOSPADM

## 2022-06-10 RX ORDER — HEPARIN SODIUM 5000 [USP'U]/ML
5000 INJECTION, SOLUTION INTRAVENOUS; SUBCUTANEOUS EVERY 8 HOURS SCHEDULED
Status: DISCONTINUED | OUTPATIENT
Start: 2022-06-10 | End: 2022-06-10

## 2022-06-10 RX ORDER — TAMSULOSIN HYDROCHLORIDE 0.4 MG/1
0.4 CAPSULE ORAL
Status: DISCONTINUED | OUTPATIENT
Start: 2022-06-10 | End: 2022-06-10

## 2022-06-10 RX ORDER — HEPARIN SODIUM 5000 [USP'U]/ML
5000 INJECTION, SOLUTION INTRAVENOUS; SUBCUTANEOUS EVERY 8 HOURS SCHEDULED
Status: COMPLETED | OUTPATIENT
Start: 2022-06-10 | End: 2022-06-10

## 2022-06-10 RX ORDER — ONDANSETRON 2 MG/ML
4 INJECTION INTRAMUSCULAR; INTRAVENOUS EVERY 6 HOURS PRN
Status: DISCONTINUED | OUTPATIENT
Start: 2022-06-10 | End: 2022-06-11 | Stop reason: HOSPADM

## 2022-06-10 RX ORDER — FINASTERIDE 5 MG/1
5 TABLET, FILM COATED ORAL DAILY
Status: DISCONTINUED | OUTPATIENT
Start: 2022-06-10 | End: 2022-06-11 | Stop reason: HOSPADM

## 2022-06-10 RX ORDER — LEVOTHYROXINE SODIUM 0.07 MG/1
75 TABLET ORAL
Status: DISCONTINUED | OUTPATIENT
Start: 2022-06-10 | End: 2022-06-11 | Stop reason: HOSPADM

## 2022-06-10 RX ADMIN — SODIUM CHLORIDE, SODIUM LACTATE, POTASSIUM CHLORIDE, AND CALCIUM CHLORIDE 100 ML/HR: .6; .31; .03; .02 INJECTION, SOLUTION INTRAVENOUS at 02:12

## 2022-06-10 RX ADMIN — LEVOTHYROXINE SODIUM 75 MCG: 75 TABLET ORAL at 14:11

## 2022-06-10 RX ADMIN — LIDOCAINE 5% 1 PATCH: 700 PATCH TOPICAL at 10:01

## 2022-06-10 RX ADMIN — SODIUM CHLORIDE 3 G: 9 INJECTION, SOLUTION INTRAVENOUS at 01:59

## 2022-06-10 RX ADMIN — FOLIC ACID 1 MG: 1 TABLET ORAL at 10:01

## 2022-06-10 RX ADMIN — ACETAMINOPHEN 975 MG: 325 TABLET ORAL at 22:02

## 2022-06-10 RX ADMIN — HEPARIN SODIUM 5000 UNITS: 5000 INJECTION INTRAVENOUS; SUBCUTANEOUS at 22:02

## 2022-06-10 RX ADMIN — FINASTERIDE 5 MG: 5 TABLET, FILM COATED ORAL at 16:53

## 2022-06-10 RX ADMIN — ACETAMINOPHEN 975 MG: 325 TABLET ORAL at 14:11

## 2022-06-10 RX ADMIN — HEPARIN SODIUM 5000 UNITS: 5000 INJECTION INTRAVENOUS; SUBCUTANEOUS at 05:58

## 2022-06-10 RX ADMIN — TAMSULOSIN HYDROCHLORIDE 0.4 MG: 0.4 CAPSULE ORAL at 06:10

## 2022-06-10 RX ADMIN — ACETAMINOPHEN 975 MG: 325 TABLET ORAL at 05:51

## 2022-06-10 RX ADMIN — GABAPENTIN 100 MG: 100 CAPSULE ORAL at 22:02

## 2022-06-10 RX ADMIN — HEPARIN SODIUM 5000 UNITS: 5000 INJECTION INTRAVENOUS; SUBCUTANEOUS at 14:11

## 2022-06-10 RX ADMIN — Medication 1 TABLET: at 10:01

## 2022-06-10 RX ADMIN — THIAMINE HCL TAB 100 MG 100 MG: 100 TAB at 10:01

## 2022-06-10 NOTE — ASSESSMENT & PLAN NOTE
· Continue CIWA protocol  · Encourage abstinence  · Would offer HOST referral and other resources for alcohol use disorder

## 2022-06-10 NOTE — TRAUMA DOCUMENTATION
Patient becoming increasingly irritable at this time, needing increased amount of redirection  Provider putting in an order for ativan to try and calm patient at this time

## 2022-06-10 NOTE — ASSESSMENT & PLAN NOTE
- possibly related to during catheterization  - will follow up Urology recommendations  - CT scan reviewed with attending  - monitor urinary output   - urology to evaluate in a m
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
## 2022-06-10 NOTE — CONSULTS
Patient Name: Nish Lao YOB: 1930    Medical Record No : [id number]     Admit/Registration Date: 6/9/2022 10:56 PM  Date of Consult: 06/10/22      Oral and Maxillofacial Surgery Consult Note    Assessment:  80 y o  male with mildly displaced b/l nasal bone fx     Plan/Recs:  -No acute OMFS intervention at this time  -Sinus precautions  -Analgesia per primary  -Decongestants prn  -NS nasal spray prn  -Ice to face  -HOB elevated 30 degrees  -Follow up with [facility] outpatient as needed  -Follow up with general dentist for dental decay    -----------------------------------------    Chief Complaint:  "I guess I fell"    HPI:  80 y o  male who presents with facial trauma  Pt reports he is not sure what happened  Chart review hx showing he was at a bar and fell  Denies LOC, changes in vision, occulusion, hearing  Pt reports he is able to freely pass air through b/l nares  Pt is aware of cosmetic defect of nose and does not want surgery  Pt reports he has a general dentist appointment next week to follow up with caries/PAPs  Pre-admission records reviewed  PMH/PSH/Meds/Allergies Reviewed  Past Medical History:   Diagnosis Date    Anemia     BPH (benign prostatic hyperplasia)     Cancer (HCC)     malignant neoplasm of thyroid gland    Gout     Hyperlipidemia     Pacemaker     Pt has d/t sick sinus syndrome    Pulmonary nodule     Sick sinus syndrome (HCC)      Past Surgical History:   Procedure Laterality Date    CARDIAC PACEMAKER PLACEMENT      COLONOSCOPY      EGD      HEMORRHOID SURGERY      HERNIA REPAIR      inguinal hernia    [place] THYROID LOBECTOMY,UNILAT Left 12/22/2021    Procedure: THYROIDECTOMY WITH ISTHMUSECTOMY; POSSIBLE TOTAL THYROIDECTOMY; FROZEN SECTION ANALYSIS; NIM MONITOR SET-UP AND USE;   Surgeon: Horace Bach MD;  Location:  MAIN OR;  Service: ENT   [address] Right        No Known Allergies    Social History     Socioeconomic History    Marital status:      Spouse name: Not on file    Number of children: Not on file    Years of education: Not on file    Highest education level: Not on file   Occupational History    Not on file   Tobacco Use    Smoking status: Never Smoker    Smokeless tobacco: Never Used   Vaping Use    Vaping Use: Never used   Substance and Sexual Activity    Alcohol use:  Yes     Alcohol/week: 1 0 standard drink     Types: 1 Glasses of wine per week     Comment: Pt has an occasional glass of wine    Drug use: Not Currently    Sexual activity: Not on file     Comment: did not ask   Other Topics Concern    Not on file   Social History Narrative    Not on file     Social Determinants of Health     Financial Resource Strain: Not on file   Food Insecurity: Not on file   Transportation Needs: Not on file   Physical Activity: Not on file   Stress: Not on file   Social Connections: Not on file   Intimate Partner Violence: Not on file   Housing Stability: Not on file       Scheduled Medications  Current Facility-Administered Medications   Medication Dose Route Frequency Provider Last Rate    acetaminophen  975 mg Oral [facility] Ivon KHOURY DO      ampicillin-sulbactam  3 g Intravenous Q6H Jenifer Mao DO 3 g (09/28/89 0324)    folic acid  1 mg Oral Daily Ivon Mao DO      gabapentin  100 mg Oral HS [place] Real, DO      heparin (porcine)  5,000 Units Subcutaneous Q8H [facility] Ivon KHOURY DO      HYDROmorphone  0 2 mg Intravenous Q4H PRN Jenifer Mao DO      lactated ringers  100 mL/hr Intravenous Continuous Jenifer Mao  mL/hr (06/10/22 3405)    lidocaine  1 patch Topical Daily [place] Real, DO      multivitamin-minerals  1 tablet Oral Daily Ivon Mao DO      ondansetron  4 mg Intravenous Q6H PRN [place] Real, DO      oxyCODONE  2 5 mg Oral Q6H PRN [place] Real, DO      oxyCODONE  5 mg Oral Q6H PRN [place] Real, DO      tamsulosin  0 4 mg Oral Daily With [facility],       thiamine  100 mg Oral Daily Jordin Jamariy, DO         PRN Medications    HYDROmorphone    ondansetron    oxyCODONE    oxyCODONE    Medication Infusions  lactated ringers, 100 mL/hr, Last Rate: 100 mL/hr (06/10/22 5067)        Review of Systems   Constitutional: Negative  HENT: Positive for dental problem and facial swelling  Negative for voice change  Respiratory: Negative  Cardiovascular: Negative  Skin: Positive for wound  Neurological: Negative  Psychiatric/Behavioral: Negative  All other systems reviewed and are negative  Vitals:    Temp:  [97 4 °F (36 3 °C)-97 8 °F (36 6 °C)] 97 4 °F (36 3 °C)  HR:  [60-78] 60  Resp:  [16-21] 17  BP: ()/() 163/90  Wt Readings from Last 1 Encounters:   12/22/21 74 8 kg (165 lb)     Ht Readings from Last 1 Encounters:   12/22/21 5' 4 5" (1 638 m)     There is no height or weight on file to calculate BMI  Respiratory  Report   Lab Data (Last 4 hours)    None         O2/Vent Data (Last 4 hours)    None              Patient Lines/Drains/Airways Status     Active Airway     None              I/O:  Current Diet Order:        Diet Orders   (From admission, onward)             Start     Ordered    06/10/22 0202  Diet NPO; Sips with meds  Diet effective now        References:    Nutrtion Support Algorithm Enteral vs  Parenteral   Question Answer Comment   Diet Type NPO    NPO Except: Sips with meds    RD to adjust diet per protocol?  Yes        06/10/22 0201                 Intake/Output Summary (Last 24 hours) at 6/10/2022 0915  Last data filed at 6/10/2022 0647  Gross per 24 hour   Intake 493 33 ml   Output 1000 ml   Net -506 67 ml       Labs:  Results from last 7 days   Lab Units 06/10/22  0458 06/09/22  1857   WBC Thousand/uL 8 84 7 64   HEMOGLOBIN g/dL 13 0 13 0   HEMATOCRIT % 39 2 38 2   PLATELETS Thousands/uL 187 199     Results from last 7 days   Lab Units 06/10/22  0458 06/09/22  1857   POTASSIUM mmol/L 4 0 3 8   CHLORIDE mmol/L 98* 96*   CO2 mmol/L 21 21   BUN mg/dL 8 11   CREATININE mg/dL 0 84 0 96   CALCIUM mg/dL 8 1* 7 4*     Results from last 7 days   Lab Units 06/10/22  0458 06/09/22  1857   INR  1 01 1 01   PTT seconds  --  27         Pain Management Panel    There is no flowsheet data to display  Imaging:   CT FACIAL BONES WITHOUT INTRAVENOUS CONTRAST     INDICATION:   TRAUMA      COMPARISON: None      TECHNIQUE:  Axial CT images were obtained through the facial bones with additional sagittal and coronal reconstructions      Radiation dose length product (DLP) for this visit:  357 mGy-cm   This examination, like all CT scans performed in the [facility], was performed utilizing techniques to minimize radiation dose exposure, including the use of iterative   reconstruction and automated exposure control        IMAGE QUALITY:  Diagnostic      FINDINGS:      FACIAL BONES:  Mildly displaced acute bilateral nasal bone fractures      Chronic appearing leftward nasal septal deviation      Extensive dental disease  Numerous periapical lucencies in keeping with with periodontal disease      ORBITS:  Orbital globes, optic nerves, and extraocular muscles appear symmetric and normal  There is no evidence of retrobulbar mass, abscess, or hematoma      SINUSES:  Mild mucosal disease of the paranasal sinuses without fluid collections      SOFT TISSUES:  Normal      IMPRESSION:     Mildly displaced acute bilateral nasal bone fractures      Physical Exam:   General: Integment: skin warm and dry, patient is WD/WN, Voice quality: WNL, in NAD  Neuro Exam: AAO x 3, CN V,VII grossly intac  Head: Normocephalic, no scalp lacerations or hematoma   Face: No lacerations/Step Offs, multiple minor abrasions  Ears: Pinna wnl bilaterally, no otorrhea, hearing grossly intact   Eyes/Periorbital: Pupils equal, round, reactive to light and Extraocular movements intact, intercanthal distance wnl   Nose: External nose mild asymmetry w/ depression on L, no nasal crepitus, no nasal septal hematoma, no rhinorrhea, no epistaxis, bilateral nares patent, abrasion on dorsum   Oral Exam:Lips and mucosal surfaces wnl, floor of mouth is soft with no palpable masses, tongue protrusion is midline and has full range of motion, no pharyngeal edema or exudate   Dentalalveolar Exam: Poor dentition, multiple carious teeth, No TTP, CYNTHIA >30mm, lateral excursive movements wnl   Lymph/Neck Exam: Neck is soft, trachea is midline, no gross cervical lymphadenopathy bilaterally       Charlene Davies DMD PGY3 in conjunction with Dustin Other DDS

## 2022-06-10 NOTE — CONSULTS
Inpatient consult to Urology  Consult performed by: Juan Prescott PA-C  Consult ordered by: Isaias Merino PA-C      Consult: Tip Pineda 80 y o  male [phone]   Unit/Bed #: [facility] 615-01  Encounter: [id number]        Assessment  & Plan  :    Gross hematuria:  -developed after straight catheterization  -patient straight cathed for 600 cc  -currently recommend insertion of urethral Alberto catheter  -CT scan reveals severely enlarged prostate  -recommend proscar, caution in Flomax, do believe this will benefit this patient due to large prostate but also risk orthostatic hypotension in a patient with recent falls   -due to enlarged prostate, patient requires additional catheterization recommend maintaining indwelling urethral Alberto catheter to tamponade bleeding with void trial prior to discharge   -encourage ambulation, provided good bowel regimen, limit factors that may contribute to urinary retention  -patient currently able to void without any difficulty at this time  May hold off on urethral Alberto catheter for gross hematuria  -hemoglobin currently stable at 13 0  -serial urine collection    Urology to evaluate tomorrow  Subjective :    Neva Canchola  is a 80 y o  male who presented to [address] [place] [address] after a fall while at a bar  Patient was transferred to [address] [facility] to trauma service for further evaluation  Patient noted to have facial fractures  Patient also developing urinary retention while hospitalized now status post straight catheterization with resultant gross hematuria  Patient reports that he typically has frequency and urgency on outpatient currently managed with Flomax  He denies any additional trouble with urination  Only report reports worsening urinary symptoms with alcohol intake  Denies following with a urologist   Denies any prior  history  Patient may not be the best historian  But is able to provide some subjective information      No Known Allergies   Current Outpatient Medications   Medication Instructions    tamsulosin (FLOMAX) 0 4 mg, Oral, Daily with dinner      Past Medical History:   Diagnosis Date    Anemia     BPH (benign prostatic hyperplasia)     Cancer (HCC)     malignant neoplasm of thyroid gland    Gout     Hyperlipidemia     Pacemaker     Pt has d/t sick sinus syndrome    Pulmonary nodule     Sick sinus syndrome (HCC)      Past Surgical History:   Procedure Laterality Date    CARDIAC PACEMAKER PLACEMENT      COLONOSCOPY      EGD      HEMORRHOID SURGERY      HERNIA REPAIR      inguinal hernia    [place] THYROID LOBECTOMY,UNILAT Left 12/22/2021    Procedure: THYROIDECTOMY WITH ISTHMUSECTOMY; POSSIBLE TOTAL THYROIDECTOMY; FROZEN SECTION ANALYSIS; NIM MONITOR SET-UP AND USE; Surgeon: Vivek Chan MD;  Location:  MAIN OR;  Service: ENT    ROTATOR CUFF REPAIR Right      Family History   Problem Relation Age of Onset    Thyroid disease unspecified Neg Hx      Social History     Socioeconomic History    Marital status:      Spouse name: None    Number of children: None    Years of education: None    Highest education level: None   Occupational History    None   Tobacco Use    Smoking status: Never Smoker    Smokeless tobacco: Never Used   Vaping Use    Vaping Use: Never used   Substance and Sexual Activity    Alcohol use:  Yes     Alcohol/week: 1 0 standard drink     Types: 1 Glasses of wine per week     Comment: Pt has an occasional glass of wine    Drug use: Not Currently    Sexual activity: None     Comment: did not ask   Other Topics Concern    None   Social History Narrative    None     Social Determinants of Health     Financial Resource Strain: Not on file   Food Insecurity: Not on file   Transportation Needs: Not on file   Physical Activity: Not on file   Stress: Not on file   Social Connections: Not on file   Intimate Partner Violence: Not on file   Housing Stability: Not on file        Review of Systems   Constitutional: Negative  Negative for chills and fever  HENT: Negative  Eyes: Negative  Respiratory: Negative  Cardiovascular: Negative  Gastrointestinal: Negative  Negative for abdominal pain, diarrhea, nausea and vomiting  Endocrine: Negative  Genitourinary: Positive for frequency, hematuria and urgency  Negative for difficulty urinating, dysuria and flank pain  Musculoskeletal: Negative  Skin: Negative  Allergic/Immunologic: Negative  Neurological: Negative  Hematological: Negative  Psychiatric/Behavioral: Negative  Objective     Physical Exam  Constitutional:       General: He is not in acute distress  Appearance: He is normal weight  He is not ill-appearing, toxic-appearing or diaphoretic  HENT:      Head: Abrasion and contusion present  Right Ear: External ear normal       Left Ear: External ear normal       Nose: Nose normal       Mouth/Throat:      Pharynx: Oropharynx is clear  Eyes:      General: No scleral icterus  Conjunctiva/sclera: Conjunctivae normal    Cardiovascular:      Rate and Rhythm: Normal rate and regular rhythm  Pulses: Normal pulses  Heart sounds: No murmur heard  No friction rub  No gallop  Pulmonary:      Effort: Pulmonary effort is normal  No respiratory distress  Breath sounds: No wheezing, rhonchi or rales  Abdominal:      General: Bowel sounds are normal  There is no distension  Tenderness: There is no abdominal tenderness  Genitourinary:     Comments: Blood noted from the meatus currently or no active bleeding  Musculoskeletal:         General: Normal range of motion  Cervical back: Normal range of motion  Skin:     General: Skin is warm and dry  Neurological:      General: No focal deficit present  Mental Status: He is alert and oriented to person, place, and time     Psychiatric:         Mood and Affect: Mood normal          Behavior: Behavior normal          Thought Content: Thought content normal          Judgment: Judgment normal                 Imaging:  CT CHEST, ABDOMEN AND PELVIS WITHOUT IV CONTRAST     INDICATION:   trauma      COMPARISON:  AP chest from earlier today      TECHNIQUE: CT examination of the chest, abdomen and pelvis was performed without intravenous contrast  This examination was performed without intravenous contrast in the context of the critical nationwide Omnipaque shortage  Axial, sagittal, and coronal   2D reformatted images were created from the source data and submitted for interpretation      Radiation dose length product (DLP) for this visit:  836 mGy-cm   This examination, like all CT scans performed in the [facility], was performed utilizing techniques to minimize radiation dose exposure, including the use of iterative   reconstruction and automated exposure control       Enteric contrast was administered       FINDINGS:     CHEST     LUNGS:  No pulmonary contusion      Mild biapical pleural/parenchymal scarring  3 mm right upper lobe calcified granuloma      PLEURA:  Unremarkable      HEART/GREAT VESSELS: Cardiomegaly  No thoracic aortic aneurysm  Coronary artery calcifications  No pericardial effusion      MEDIASTINUM AND JANUARY:  Unremarkable      CHEST WALL AND LOWER NECK:  Left anterior chest wall dual-lead cardiac device      ABDOMEN     LIVER/BILIARY TREE:  Unremarkable      GALLBLADDER:  There are gallstone(s) within the gallbladder, without pericholecystic inflammatory changes      SPLEEN:  Unremarkable      PANCREAS:  Unremarkable      ADRENAL GLANDS:  Unremarkable      KIDNEYS/URETERS:  Unremarkable  No hydronephrosis      STOMACH AND BOWEL:  There is colonic diverticulosis without evidence of acute diverticulitis      APPENDIX:  No findings to suggest appendicitis      ABDOMINOPELVIC CAVITY:  No ascites  No pneumoperitoneum    No lymphadenopathy      VESSELS:  Unremarkable for patient's age      PELVIS     REPRODUCTIVE ORGANS:  Severe prostamegaly protruding into the bladder base      URINARY BLADDER:  Unremarkable      ABDOMINAL WALL/INGUINAL REGIONS:  Unremarkable      OSSEOUS STRUCTURES:  Acute mildly displaced fractures of the anterolateral right 4th through 7th ribs      IMPRESSION:     Acute mildly displaced fractures of the anterolateral right 4th through 7th ribs            Labs:  Lab Results   Component Value Date    SODIUM 129 (L) 06/10/2022    K 4 0 06/10/2022    CL 98 (L) 06/10/2022    CO2 21 06/10/2022    BUN 8 06/10/2022    CREATININE 0 84 06/10/2022    GLUC 99 06/10/2022    CALCIUM 8 1 (L) 06/10/2022         Lab Results   Component Value Date    WBC 8 84 06/10/2022    HGB 13 0 06/10/2022    HCT 39 2 06/10/2022    MCV 90 06/10/2022     06/10/2022         VTE Pharmacologic Prophylaxis: Enoxaparin (Lovenox)  VTE Mechanical Prophylaxis: sequential compression device     Fausto Maria PA-C

## 2022-06-10 NOTE — PLAN OF CARE
Problem: OCCUPATIONAL THERAPY ADULT  Goal: Performs self-care activities at highest level of function for planned discharge setting  See evaluation for individualized goals  Description: Treatment Interventions: ADL retraining, Functional transfer training, Endurance training, Cognitive reorientation, Patient/family training, Equipment evaluation/education, Compensatory technique education, Energy conservation, Activityengagement          See flowsheet documentation for full assessment, interventions and recommendations  Note: Limitation: Decreased ADL status, Decreased Safe judgement during ADL, Decreased endurance, Decreased cognition, Decreased self-care trans, Decreased high-level ADLs  Prognosis: Good  Assessment: 79 YO Male SEEN FOR INITIAL OCCUPATIONAL THERAPY EVALUATION FOLLOWING TXF FROM GSL->SLB S/P FALL AT WHILE INTOXICATED RESULTING IN NASAL BONE FX AND R SIDED RIB FXS  PROBLEMS LIST INCLUDES ACUTE TOXIC METABOLIC ENCEPHALOPATHY, Anemia, BPH (benign prostatic hyperplasia), Cancer ([facility] ), Gout, Hyperlipidemia, Pacemaker, Pulmonary nodule, and Sick sinus syndrome ([facility] )  PT IS FROM HOME ALONE WHERE HE REPORTS BEING INDEPENDENT WITH ADLS/IADLS/DRIVING PTA  PT CURRENTLY REQUIRES OVERALL MIN-MOD A WITH ADLS, AND MIN A WITH TRANSFERS /FUNCTIONAL MOBILITY WITH USE OF RW  PT IS IMPULSIVE REQUIRING CUES FOR SLOWING OF PACE T/O SESSION  PT IS LIMITED 2' PAIN, FATIGUE, IMPAIRED BALANCE, FALL RISK , LIMITED SAFETY AWARENESS/INSIGHT/JUDGEMENT, IMPULSIVE, OVERALL WEAKNESS/DECONDITIONING , LIMITED FAMILY/FRIEND SUPPORT  and OVERALL LIMITED ACTIVITY TOLERANCE  PT EDUCATED ON DEEP BREATHING TECHNIQUES T/O ACTIVITY, SLOWING OF PACE, ENERGY CONSERVATION TECHNIQUES FOR CARRY OVER UPON D/C and CONTINUE PARTICIPATION IN SELF-CARE/MOBILITY WITH STAFF WHILE IN THE HOSPITAL   The patient's raw score on the AM-PAC Daily Activity inpatient short form is 17, standardized score is 37 26, less than 39 4   Patients at this level are likely to benefit from discharge to post-acute rehabilitation services  Please refer to the recommendation of the Occupational Therapist for safe discharge planning  FROM AN OCCUPATIONAL THERAPY PERSPECTIVE, PT WOULD BENEFIT FROM ADDITIONAL OT SERVICES IN AN INPT REHAB SETTING UPON D/C  PT WOULD BENEFIT FROM FORMAL COG ASSESSMENT PRIOR TO RETURNING HOME ALONE  WILL CONT TO FOLLOW TO ADDRESS THE BELOW DESCRIBED GOALS  Recommendation: Geriatric Consult  OT Discharge Recommendation: Post acute rehabilitation services  OT - OK to Discharge:  Yes

## 2022-06-10 NOTE — RESPIRATORY THERAPY NOTE
06/10/22 0049   Respiratory Protocol   Protocol Initiated? Yes   Protocol Selection Airway Clearance   Language Barrier? Yes   Medical & Social History Reviewed? Yes   Diagnostic Studies Reviewed? Yes   Physical Assessment Performed? Yes   Airway Clearance Plan Incentive Spirometer   Respiratory Assessment   Assessment Type Assess only   Respiratory Pattern Normal   Chest Assessment Chest expansion symmetrical   Bilateral Breath Sounds Clear   R Breath Sounds Clear   L Breath Sounds Clear   Cough None   Resp Comments Pt was admitted to the hospital due to a fall  Pt state he fell from standing at a bar  Pt seem intoxicated  There is a fracture of the nasal bone and ribs 4th to the seventh  Bs are clear bilaterally  There is no pulmonary history or medication on file  Will continue to monitor

## 2022-06-10 NOTE — ASSESSMENT & PLAN NOTE
· Denies pain or shortness of breath  · States rib fractures are from a previous injury several weeks ago  · Previous chest x-ray correlates with some of the rib fracture seen on CT chest   · Suggest change Tylenol to 975 mg p o  q 8 hours p r n  pain  · Suggest discontinue gabapentin  · Can continue lidocaine patch, on for 12 hours and off for 12 hours until discharge  · Suggest discontinue oxycodone and Dilaudid  · Ice to painful area as needed  · There does not appear to be an indication for a pain regimen at discharge

## 2022-06-10 NOTE — ASSESSMENT & PLAN NOTE
- OMFS consulted, appreciate input  - No further work-up at this time  - Outpatient follow-up  - Sinus precautions  - no abx at this time

## 2022-06-10 NOTE — PROGRESS NOTES
Trauma Res Ivon notified pt voiding frequently 100-200 at a time, Pvr 549, 519 trending  Also notified pt with red flank blood in stool  Trauma Res draw Am labs, continue to monitor, give flomax and cath at 0600

## 2022-06-10 NOTE — ASSESSMENT & PLAN NOTE
- Multiple right-sided rib fractures, present on admission   - Continue rib fracture protocol   - Continue to encourage incentive spirometer use and adequate pulmonary hygiene  Currently pulling 1000 mL on I S  - Continue multimodal analgesic regimen  Appreciate APS evaluation and recommendations   - Supplemental oxygen via nasal cannula as needed to maintain saturations greater than or equal to 94%  - PT and OT evaluation and treatment as indicated  - Outpatient follow-up in the trauma clinic for re-evaluation in approximately 2 weeks

## 2022-06-10 NOTE — PLAN OF CARE
Problem: PAIN - ADULT  Goal: Verbalizes/displays adequate comfort level or baseline comfort level  Description: Interventions:  - Encourage patient to monitor pain and request assistance  - Assess pain using appropriate pain scale  - Administer analgesics based on type and severity of pain and evaluate response  - Implement non-pharmacological measures as appropriate and evaluate response  - Consider cultural and social influences on pain and pain management  - Notify physician/advanced practitioner if interventions unsuccessful or patient reports new pain  Outcome: Progressing     Problem: INFECTION - ADULT  Goal: Absence or prevention of progression during hospitalization  Description: INTERVENTIONS:  - Assess and monitor for signs and symptoms of infection  - Monitor lab/diagnostic results  - Monitor all insertion sites, i e  indwelling lines, tubes, and drains  - Monitor endotracheal if appropriate and nasal secretions for changes in amount and color  - [place] appropriate cooling/warming therapies per order  - Administer medications as ordered  - Instruct and encourage patient and family to use good hand hygiene technique  - Identify and instruct in appropriate isolation precautions for identified infection/condition  Outcome: Progressing  Goal: Absence of fever/infection during neutropenic period  Description: INTERVENTIONS:  - Monitor WBC    Outcome: Progressing     Problem: SAFETY ADULT  Goal: Patient will remain free of falls  Description: INTERVENTIONS:  - Educate patient/family on patient safety including physical limitations  - Instruct patient to call for assistance with activity   - Consult OT/PT to assist with strengthening/mobility   - Keep Call bell within reach  - Keep bed low and locked with side rails adjusted as appropriate  - Keep care items and personal belongings within reach  - Initiate and maintain comfort rounds  - Make Fall Risk Sign visible to staff  - Offer Toileting every 1 Hours, in advance of need  - Initiate/Maintain alarm  - Obtain necessary fall risk management equipment  - Apply yellow socks and bracelet for high fall risk patients  - Consider moving patient to room near nurses station  Outcome: Progressing  Goal: Maintain or return to baseline ADL function  Description: INTERVENTIONS:  -  Assess patient's ability to carry out ADLs; assess patient's baseline for ADL function and identify physical deficits which impact ability to perform ADLs (bathing, care of mouth/teeth, toileting, grooming, dressing, etc )  - Assess/evaluate cause of self-care deficits   - Assess range of motion  - Assess patient's mobility; develop plan if impaired  - Assess patient's need for assistive devices and provide as appropriate  - Encourage maximum independence but intervene and supervise when necessary  - Involve family in performance of ADLs  - Assess for home care needs following discharge   - Consider OT consult to assist with ADL evaluation and planning for discharge  - Provide patient education as appropriate  Outcome: Progressing  Goal: Maintains/Returns to pre admission functional level  Description: INTERVENTIONS:  - Perform BMAT or MOVE assessment daily    - Set and communicate daily mobility goal to care team and patient/family/caregiver  - Collaborate with rehabilitation services on mobility goals if consulted  - Perform Range of Motion 3 times a day  - Reposition patient every 2 hours    - Dangle patient 3 times a day  - Stand patient 3 times a day  - Ambulate patient 3 times a day  - Out of bed to chair 3 times a day   - Out of bed for meals 3 times a day  - Out of bed for toileting  - Record patient progress and toleration of activity level   Outcome: Progressing     Problem: DISCHARGE PLANNING  Goal: Discharge to home or other facility with appropriate resources  Description: INTERVENTIONS:  - Identify barriers to discharge w/patient and caregiver  - Arrange for needed discharge resources and transportation as appropriate  - Identify discharge learning needs (meds, wound care, etc )  - Arrange for interpretive services to assist at discharge as needed  - Refer to Case Management Department for coordinating discharge planning if the patient needs post-hospital services based on physician/advanced practitioner order or complex needs related to functional status, cognitive ability, or social support system  Outcome: Progressing     Problem: Knowledge Deficit  Goal: Patient/family/caregiver demonstrates understanding of disease process, treatment plan, medications, and discharge instructions  Description: Complete learning assessment and assess knowledge base    Interventions:  - Provide teaching at level of understanding  - Provide teaching via preferred learning methods  Outcome: Progressing     Problem: NEUROSENSORY - ADULT  Goal: Achieves stable or improved neurological status  Description: INTERVENTIONS  - Monitor and report changes in neurological status  - Monitor vital signs such as temperature, blood pressure, glucose, and any other labs ordered   - Initiate measures to prevent increased intracranial pressure  - Monitor for seizure activity and implement precautions if appropriate      Outcome: Progressing  Goal: Remains free of injury related to seizures activity  Description: INTERVENTIONS  - Maintain airway, patient safety  and administer oxygen as ordered  - Monitor patient for seizure activity, document and report duration and description of seizure to physician/advanced practitioner  - If seizure occurs,  ensure patient safety during seizure  - Reorient patient post seizure  - Seizure pads on all 4 side rails  - Instruct patient/family to notify RN of any seizure activity including if an aura is experienced  - Instruct patient/family to call for assistance with activity based on nursing assessment  - Administer anti-seizure medications if ordered    Outcome: Progressing  Goal: Achieves maximal functionality and self care  Description: INTERVENTIONS  - Monitor swallowing and airway patency with patient fatigue and changes in neurological status  - Encourage and assist patient to increase activity and self care     - Encourage visually impaired, hearing impaired and aphasic patients to use assistive/communication devices  Outcome: Progressing     Problem: RESPIRATORY - ADULT  Goal: Achieves optimal ventilation and oxygenation  Description: INTERVENTIONS:  - Assess for changes in respiratory status  - Assess for changes in mentation and behavior  - Position to facilitate oxygenation and minimize respiratory effort  - Oxygen administered by appropriate delivery if ordered  - Initiate smoking cessation education as indicated  - Encourage broncho-pulmonary hygiene including cough, deep breathe, Incentive Spirometry  - Assess the need for suctioning and aspirate as needed  - Assess and instruct to report SOB or any respiratory difficulty  - Respiratory Therapy support as indicated  Outcome: Progressing     Problem: GENITOURINARY - ADULT  Goal: Maintains or returns to baseline urinary function  Description: INTERVENTIONS:  - Assess urinary function  - Encourage oral fluids to ensure adequate hydration if ordered  - Administer IV fluids as ordered to ensure adequate hydration  - Administer ordered medications as needed  - Offer frequent toileting  - Follow urinary retention protocol if ordered  Outcome: Progressing  Goal: Absence of urinary retention  Description: INTERVENTIONS:  - Assess patients ability to void and empty bladder  - Monitor I/O  - Bladder scan as needed  - Discuss with physician/AP medications to alleviate retention as needed  - Discuss catheterization for long term situations as appropriate  Outcome: Progressing  Goal: Urinary catheter remains patent  Description: INTERVENTIONS:  - Assess patency of urinary catheter  - If patient has a chronic laurent, consider changing catheter if non-functioning  - Follow guidelines for intermittent irrigation of non-functioning urinary catheter  Outcome: Progressing

## 2022-06-10 NOTE — PROGRESS NOTES
[facility]  Progress Note Monet Finders 9/25/1930, 80 y o  male MRN: [id number]  Unit/Bed#: [facility] 336-16 Encounter: [id number]  Primary Care Provider: Sandra Reveles DO   Date and time admitted to hospital: 6/9/2022 10:56 PM    History of hypothyroidism  Assessment & Plan  - Continue current medication regimen   - Outpatient follow-up with PCP  Hematuria  Assessment & Plan  - possibly related to during catheterization  - will follow up Urology recommendations  - CT scan reviewed with attending  - monitor urinary output   - urology to evaluate in a m  Fall  Assessment & Plan  - Status post fall with the below noted injuries  - Fall precautions  - Geriatric Medicine consultation for evaluation, medication review and recommendations   - PT and OT evaluation and treatment as indicated  - Case Management consultation for disposition planning  Alcohol abuse  Assessment & Plan  - CIWA protocol  - Case Management consultation  - Follow-up recs    Closed fracture of multiple ribs of right side  Assessment & Plan  - Multiple right-sided rib fractures, present on admission   - Continue rib fracture protocol   - Continue to encourage incentive spirometer use and adequate pulmonary hygiene  Currently pulling 1000 mL on I S  - Continue multimodal analgesic regimen  Appreciate APS evaluation and recommendations   - Supplemental oxygen via nasal cannula as needed to maintain saturations greater than or equal to 94%  - PT and OT evaluation and treatment as indicated  - Outpatient follow-up in the trauma clinic for re-evaluation in approximately 2 weeks        BPH (benign prostatic hyperplasia)  Assessment & Plan  - Discontinue flomax and start finasteride    * Closed fracture of nasal bone  Assessment & Plan  - OMFS consulted, appreciate input  - No further work-up at this time  - Outpatient follow-up  - Sinus precautions  - no abx at this time    DVT Prophylaxis: SCDs and Lovenox  PT and OT: eval and treat    Disposition: D/C tomorrow on 6/11 pending urology consultation  No other work-up at this time  Appreciate OMFS, Urology, Geriatric recs  TERTIARY TRAUMA SURVEY NOTE    Code status:  Level 1 - Full Code    Consultants: Orthopedics, Geriatrics, OMFS      SUBJECTIVE:     Transfer from: [facility]  Mechanism of Injury:Fall    Chief Complaint: "I fell "    HPI/Last 24 hour events:  Patient offering no new complaints  Reports he is doing well at this time  Denying any new pain      Active medications:           Current Facility-Administered Medications:     acetaminophen (TYLENOL) tablet 975 mg, 975 mg, Oral, Q8H [address], 975 mg at 06/10/22 1411    [START ON 6/11/2022] enoxaparin (LOVENOX) subcutaneous injection 30 mg, 30 mg, Subcutaneous, Q12H SALONI    finasteride (PROSCAR) tablet 5 mg, 5 mg, Oral, Daily, 5 mg at 48/27/63 3041    folic acid (FOLVITE) tablet 1 mg, 1 mg, Oral, Daily, 1 mg at 06/10/22 1001    gabapentin (NEURONTIN) capsule 100 mg, 100 mg, Oral, HS    heparin (porcine) subcutaneous injection 5,000 Units, 5,000 Units, Subcutaneous, Q8H [address], 5,000 Units at 06/10/22 1411 **AND** [CANCELED] Platelet count, , , Once    levothyroxine tablet 75 mcg, 75 mcg, Oral, Early Morning, 75 mcg at 06/10/22 1411    lidocaine (LIDODERM) 5 % patch 1 patch, 1 patch, Topical, Daily, 1 patch at 06/10/22 1001    multivitamin-minerals (CENTRUM) tablet 1 tablet, 1 tablet, Oral, Daily, 1 tablet at 06/10/22 1001    ondansetron (ZOFRAN) injection 4 mg, 4 mg, Intravenous, Q6H PRN    tamsulosin (FLOMAX) capsule 0 4 mg, 0 4 mg, Oral, Daily With Dinner, 0 4 mg at 06/10/22 0610    thiamine tablet 100 mg, 100 mg, Oral, Daily, 100 mg at 06/10/22 1001      OBJECTIVE:     Vitals:   Vitals:    06/10/22 1516   BP: 124/61   Pulse: 61   Resp:    Temp: (!) 97 3 °F (36 3 °C)   SpO2: 98%       Physical Exam:   GENERAL APPEARANCE:  No acute distress  NEURO:  Answering questions appropriately, no evidence of focal deficits  HEENT:  Normocephalic, bilateral will nasal bone abrasions/ecchymosis  CV:  Regular rate and rhythm  LUNGS:  CTA bilaterally  GI:  Nontender, nondistended  :  No Alberto  MSK:  +2 pulses on extremities  SKIN: warm, dry, intact      1  Before the illness or injury that brought you to the Emergency, did you need someone to help you on a regular basis? 1=Yes   2  Since the illness or injury that brought you to the Emergency, have you needed more help than usual to take care of yourself? 0=No   3  Have you been hospitalized for one or more nights during the past 6 months (excluding a stay in the Emergency Department)? 0=No   4  In general, do you see well? 0=Yes   5  In general, do you have serious problems with your memory? 0=No   6  Do you take more than three different medications everyday? 1=Yes   TOTAL   2     Did you order a geriatric consult if the score was 2 or greater?: yes         PIC Score  PIC Pain Score: 3 (6/10/2022  4:00 PM)  PIC Incentive Spirometry Score: Goal to alert volume (6/10/2022  4:00 PM)  PIC Cough Description: Strong (6/10/2022  4:00 PM)  PIC Total Score: 9 (6/10/2022  4:00 PM)       If the Total PIC Score </=5, did you consult APS and evaluate patient for further intervention?: yes      Pain:    Incentive Spirometry  Cough  3 = Controlled  4 = Above goal volume 3 = Strong  2 = Moderate  3 = Goal to alert volume 2 = Weak  1 = Severe  2 = Below alert volume 1 = Absent     1 = Unable to perform IS           I/O:   I/O       06/08 0701 06/09 0700 06/09 0701  06/10 0700 06/10 0701 06/11 0700    P  O   100     I V   393 3 388  3    Total Intake  493 3 388  3    Urine  1000     Total Output  1000     Net  -506 7 +388  3                 Invasive Devices:    Invasive Devices  Report    Peripheral Intravenous Line  Duration           Peripheral IV 06/09/22 Left Forearm <1 day          Drain  Duration           Closed/Suction Drain Anterior;Right Neck 7 Fr  170 days                  Imaging: CT chest abdomen pelvis wo contrast    Result Date: 6/9/2022  Impression: Acute mildly displaced fractures of the anterolateral right 4th through 7th ribs  I personally discussed this study with Shavonne Ramirez on 6/9/2022 at 6:51 PM  Workstation performed: [id number]     XR Trauma chest portable    Result Date: 6/9/2022  Impression: No acute cardiopulmonary disease  (Please see subsequent CT, where multiple right-sided rib fractures are visible ) Workstation performed: [id number]     XR chest pa & lateral    Result Date: 6/10/2022  Impression: Right anterolateral 4th-7th rib fractures, grossly unchanged in alignment  No pneumothorax  Workstation performed: [id number]     XR hand 3+ views LEFT    Result Date: 6/10/2022  Impression: No acute osseous abnormality  Workstation performed: [id number]     XR hand 3+ vw right    Result Date: 6/10/2022  Impression: No acute osseous abnormality  Workstation performed: [id number]     TRAUMA - CT head wo contrast    Result Date: 6/9/2022  Impression: No acute intracranial abnormality  Nasal bone fractures  See the CT facial bone report  Workstation performed: [id number]     TRAUMA - CT facial bones wo contrast    Result Date: 6/9/2022  Impression: Mildly displaced acute bilateral nasal bone fractures  Workstation performed: [id number]     TRAUMA - CT spine cervical wo contrast    Result Date: 6/9/2022  Impression: No cervical spine fracture or traumatic malalignment  Workstation performed: [id number]     XR Trauma pelvis ap only 1 or 2 vw    Result Date: 6/9/2022  Impression: No acute osseous abnormality   Workstation performed: [id number]       Labs:   CBC:   Lab Results   Component Value Date    WBC 8 84 06/10/2022    HGB 13 0 06/10/2022    HCT 39 2 06/10/2022    MCV 90 06/10/2022     06/10/2022    MCH 29 7 06/10/2022    MCHC 33 2 06/10/2022    RDW 13 9 06/10/2022    MPV 9 5 06/10/2022    NRBC 0 06/10/2022     CMP:   Lab Results   Component Value Date    CL 98 (L) 06/10/2022    CO2 21 06/10/2022    BUN 8 06/10/2022    CREATININE 0 84 06/10/2022    CALCIUM 8 1 (L) 06/10/2022    AST 20 06/09/2022    ALT 18 06/09/2022    ALKPHOS 55 06/09/2022    EGFR 76 06/10/2022

## 2022-06-10 NOTE — OCCUPATIONAL THERAPY NOTE
Occupational Therapy Evaluation     Patient Name: Sara Espinosa  ASLWM'Y Date: 6/10/2022  Problem List  Principal Problem:    Closed fracture of nasal bone  Active Problems:    BPH (benign prostatic hyperplasia)    Closed fracture of multiple ribs of right side    Alcohol abuse    Fall    Past Medical History  Past Medical History:   Diagnosis Date    Anemia     BPH (benign prostatic hyperplasia)     Cancer ([address] )     malignant neoplasm of thyroid gland    Gout     Hyperlipidemia     Pacemaker     Pt has d/t sick sinus syndrome    Pulmonary nodule     Sick sinus syndrome (HCC)      Past Surgical History  Past Surgical History:   Procedure Laterality Date    CARDIAC PACEMAKER PLACEMENT      COLONOSCOPY      EGD      HEMORRHOID SURGERY      HERNIA REPAIR      inguinal hernia    OR THYROID LOBECTOMY,UNILAT Left 12/22/2021    Procedure: THYROIDECTOMY WITH ISTHMUSECTOMY; POSSIBLE TOTAL THYROIDECTOMY; FROZEN SECTION ANALYSIS; NIM MONITOR SET-UP AND USE; Surgeon: Naseem Coy MD;  Location:  MAIN OR;  Service: ENT    ROTATOR CUFF REPAIR Right          06/10/22 1134   OT Last Visit   OT Visit Date 06/10/22   Note Type   Note type Evaluation   Restrictions/Precautions   Weight Bearing Precautions Per Order No   Other Precautions Cognitive; Chair Alarm; Bed Alarm; Impulsive;Multiple lines; Fall Risk;Hard of hearing   Pain Assessment   Pain Assessment Tool 0-10   Pain Score No Pain   Hospital Pain Intervention(s) Ambulation/increased activity;Repositioned; Emotional support   Home Living   Type of [address] One level  (0 MELISSA)   Bathroom Shower/Tub Tub/shower unit   Bathroom Toilet Standard   Bathroom Equipment Shower chair   Bathroom Accessibility Accessible   Home Equipment Cane   Additional Comments NO USE OF DME AT BASELINE   Prior Function   Level of [place] Independent with ADLs and functional mobility   Lives With (S)  Alone   Receives Help From Friend(s)   ADL Assistance Independent   IADLs Independent   Falls in the last 6 months 1 to 4   Vocational Retired   [address] ADLS/IADLS/DRIVING PTA   Reciprocal Relationships LIVES ALONE  LOCAL FRIENDS HOWEVER LIMITED SUPPORT   Service to Others RETIRED; PENNY   Intrinsic Gratification ENJOYS GOING TO THE "[employer]"- [employer]  AND [employer]   Psychosocial   Psychosocial (WDL) WDL   Subjective   Subjective "THESE LEGS DON'T KNOW HOW TO SLOW DOWN"   ADL   Eating Assistance 5  Supervision/Setup   Grooming Assistance 5  Supervision/Setup   UB Bathing Assistance 4  Minimal Assistance   LB Bathing Assistance 3  Moderate Assistance   UB Dressing Assistance 4  Minimal Assistance   LB Dressing Assistance 3  Moderate [address]  4  Minimal Assistance   Functional Assistance 4  Minimal Assistance   Bed Mobility   Supine to Sit 4  Minimal assistance   Additional items Assist x 1; Increased time required;Verbal cues;LE management   Sit to Supine Unable to assess  (PT LEFT OOB WITH ALARM ON AND ALL NEEDS IN REACH)   Transfers   Sit to Stand 4  Minimal assistance   Additional items Assist x 1; Increased time required;Verbal cues; Impulsive   Stand to Sit 4  Minimal assistance   Additional items Assist x 1; Increased time required;Verbal cues; Impulsive   Functional Mobility   Functional Mobility 4  Minimal assistance   Additional Comments PT IS IMPULSIVE WITH CUES FOR SLOWING OF PACE  MOD A REQUIRED TO CORRECT LOB  Additional items Rolling walker   Balance   Static Sitting Fair   Static Standing Fair -   Ambulatory Poor +   Activity Tolerance   Activity Tolerance Patient limited by fatigue; Other (Comment)  (COG)   Medical Staff Made Aware PT SEEN FOR CO-SESSION WITH SKILLED PHYSICAL THERAPIST 2' CLINICALLY UNSTABLE PRESENTATION, POLY-TRAUMATIC INJURIES, NEW PRECAUTIONS/LIMITATIONS, LIMITED ACTIVITY TOLERANCE AND PRESENT IMPAIRMENTS WHICH ARE A REGRESSION FROM THE PT'S BASELINE AND IMPACTING OVERALL OCCUPATIONAL PERFORMANCE  Nurse Made Aware APPROPRIATE TO SEE   RUE Assessment   RUE Assessment WFL   LUE Assessment   LUE Assessment WFL   Hand Function   Gross Motor Coordination Functional   Cognition   Overall Cognitive Status Impaired   Arousal/Participation Responsive; Cooperative   Attention Attends with cues to redirect   Orientation Level Oriented to person;Oriented to place;Oriented to situation;Oriented to time  (GROSSLY ORIENTED TO TIME)   Memory Decreased recall of precautions;Decreased recall of recent events   Following Commands Follows one step commands with increased time or repetition   Comments (S)  PT IS PLEASANT AND COOPERATIVE  [place]  +HEADSTRIKE  IMPUSLIVE WITH LIMITED INSIGHT/JUDGEMENT/SAFETY AWARENESS  PT MAY BENEFIT FROM FORMAL COG ASSESSMENT PRIOR TO RETURNING HOME ALONE  Assessment   Limitation Decreased ADL status; Decreased Safe judgement during ADL;Decreased endurance;Decreased cognition;Decreased self-care trans;Decreased high-level ADLs   Prognosis Good   Assessment 79 YO Male SEEN FOR INITIAL OCCUPATIONAL THERAPY EVALUATION FOLLOWING TXF FROM [facility] S/P FALL AT WHILE INTOXICATED RESULTING IN NASAL BONE FX AND R SIDED RIB FXS  PROBLEMS LIST INCLUDES ACUTE TOXIC METABOLIC ENCEPHALOPATHY, Anemia, BPH (benign prostatic hyperplasia), Cancer ([facility] [address] ), Gout, Hyperlipidemia, Pacemaker, Pulmonary nodule, and Sick sinus syndrome ([facility] [address] )  PT IS FROM HOME ALONE WHERE HE REPORTS BEING INDEPENDENT WITH ADLS/IADLS/DRIVING PTA  PT CURRENTLY REQUIRES OVERALL MIN-MOD A WITH ADLS, AND MIN A WITH TRANSFERS /FUNCTIONAL MOBILITY WITH USE OF RW  PT IS IMPULSIVE REQUIRING CUES FOR SLOWING OF PACE T/O SESSION  PT IS LIMITED 2' PAIN, FATIGUE, IMPAIRED BALANCE, FALL RISK , LIMITED SAFETY AWARENESS/INSIGHT/JUDGEMENT, IMPULSIVE, OVERALL WEAKNESS/DECONDITIONING , LIMITED FAMILY/FRIEND SUPPORT  and OVERALL LIMITED ACTIVITY TOLERANCE   PT EDUCATED ON DEEP BREATHING TECHNIQUES T/O ACTIVITY, SLOWING OF PACE, ENERGY CONSERVATION TECHNIQUES FOR CARRY OVER UPON D/C and CONTINUE PARTICIPATION IN SELF-CARE/MOBILITY WITH STAFF WHILE IN THE HOSPITAL   The patient's raw score on the AM-PAC Daily Activity inpatient short form is 17, standardized score is 37 26, less than 39 4  Patients at this level are likely to benefit from discharge to post-acute rehabilitation services  Please refer to the recommendation of the Occupational Therapist for safe discharge planning  FROM AN OCCUPATIONAL THERAPY PERSPECTIVE, PT WOULD BENEFIT FROM ADDITIONAL OT SERVICES IN AN INPT REHAB SETTING UPON D/C  PT WOULD BENEFIT FROM FORMAL COG ASSESSMENT PRIOR TO RETURNING HOME ALONE  WILL CONT TO FOLLOW TO ADDRESS THE BELOW DESCRIBED GOALS  Goals   Patient Goals TO RETURN HOME   LTG Time Frame 10-14   Long Term Goal #1 SEE BELOW   Plan   Treatment Interventions ADL retraining;Functional transfer training; Endurance training;Cognitive reorientation;Patient/family training;Equipment evaluation/education; Compensatory technique education; Energy conservation; Activityengagement   Goal Expiration Date 06/24/22   OT Frequency 3-5x/wk   Recommendation   Recommendation Geriatric Consult   OT Discharge Recommendation Post acute rehabilitation services   OT - OK to Discharge Yes   AM-PAC Daily Activity Inpatient   Lower Body Dressing 2   Bathing 2   Toileting 3   Upper Body Dressing 3   Grooming 3   Eating 4   Daily Activity Raw Score 17   Daily Activity Standardized Score (Calc for Raw Score >=11) 37 26   AM-PAC Applied Cognition Inpatient   Following a Speech/Presentation 2   Understanding Ordinary Conversation 3   Taking Medications 2   Remembering Where Things Are Placed or Put Away 3   Remembering List of 4-5 Errands 3   Taking Care of Complicated Tasks 2   Applied Cognition Raw Score 15   Applied Cognition Standardized Score 33 54   Modified Crockett Scale   Modified Roya Scale 4       OCCUPATIONAL THERAPY GOALS TO BE MET WITHIN 10-14 DAYS:    -Pt will complete additional cognitive assessment (ACLS) with 100% attention to task in order to assist with safe d/c plan  -Pt will follow 100% simple 2-step commands and be A&O x4 consistently with environmental cues to increase participation in functional activities  -Pt will increase bed mobility to MOD I to participate in functional activities with G tolerance and balance  -Pt will improve functional mobility and transfers to MOD I on/off all surfaces w/ G balance/safety including toileting   -Pt will participate in lt grooming task with MOD I after set-up standing at sink ~3-5 minutes with G safety and balance  -Pt will increase independence in all ADLS to MOD I with G balance sitting upright in chair   -Pt will improve activity tolerance to G for 30 min txment sessions w/ G carry over of learned energy conservation techniques   -Pt will improve independence in lt homemaking activities to MOD I without requiring cues for safety   -Pt will demonstrate G carryover of learned safety techniques and proper body mechanics in functional and leisure activities with use of DME        Documentation completed by Jaden Munoz, [address] [facility], OTR/L  [facility] Certified ID# [id number]

## 2022-06-10 NOTE — CONSULTS
[facility]  Consult Note Faustino Shaw 9/25/1930, 80 y o  male MRN: [id number]  Unit/Bed#: [facility] 037-23 Encounter: [id number]  Primary Care Provider: Poornima Vu DO   Date and time admitted to hospital: 6/9/2022 10:56 PM    Alcohol abuse  Assessment & Plan  · Continue CIWA protocol  · Encourage abstinence  · Would offer HOST referral and other resources for alcohol use disorder  Closed fracture of multiple ribs of right side  Assessment & Plan  · Denies pain or shortness of breath  · States rib fractures are from a previous injury several weeks ago  · Previous chest x-ray correlates with some of the rib fracture seen on CT chest   · Suggest change Tylenol to 975 mg p o  q 8 hours p r n  pain  · Suggest discontinue gabapentin  · Can continue lidocaine patch, on for 12 hours and off for 12 hours until discharge  · Suggest discontinue oxycodone and Dilaudid  · Ice to painful area as needed  · There does not appear to be an indication for a pain regimen at discharge  Vero Whelan is a 80 y o  male  status post fall while intoxicated, initially diagnosed with acute right 4th through 7th rib fractures and nasal bone fractures  APS will sign off at this time  Thank you for the consult  All opioids and other analgesics to be written at discretion of primary team  Please contact Acute Pain Service - [facility] via [employer] from [phone] with additional questions or concerns  See Marii or Miki for additional contacts and after hours information  History of Present Illness    Admit Date:  6/9/2022  Hospital Day:  1 day  Primary Service:  Trauma  Attending Provider:  Giovanni Steven MD  Reason for Consult / Principal Problem:  Rib fractures  HPI: Vero Whelan is a 80 y o  male who presents with right 4th through 7th rib fractures and nasal bone fractures following a fall from standing while intoxicated yesterday    On my arrival, patient is sitting on the edge of the bed leaning over packing his clothing into a bag  On questioning, patient denies any pain whatsoever and no shortness of breath  He states that his rib fractures occurred several weeks ago due to a separate injury  States he is pain free from that injury  Investigation into the medical record reveals a previous x-ray from last month of the chest which showed right 6th and 7th rib fractures consistent with findings on this CT scan  Current pain location(s):  No pain  Pain Scale:   No pain  Quality:  No pain  Current Analgesic regimen:    Tylenol 975 mg p o  q 8 hours scheduled  Oxycodone 2 5 mg p o  q 6 hours p r n  moderate pain  Oxycodone 5 mg p o  q 6 hours p r n  severe pain  Dilaudid 0 2 mg IV q 4 hours p r n  breakthrough pain  Gabapentin 100 mg p o  HS scheduled  Lidocaine 5% patch, on for 12 hours and off for 12 hours  Pain History:  None  Pain Management Provider:  None    I have reviewed the patient's controlled substance dispensing history in the Prescription Drug Monitoring Program in compliance with the [facility] regulations before prescribing any controlled substances  Past 1 year review of PDMP:  No prescriptions for controlled substances    Consults    Review of Systems   Respiratory: Negative  Cardiovascular: Negative  Gastrointestinal: Negative  Musculoskeletal: Negative  Neurological: Negative          Historical Information   Past Medical History:   Diagnosis Date    Anemia     BPH (benign prostatic hyperplasia)     Cancer (HCC)     malignant neoplasm of thyroid gland    Gout     Hyperlipidemia     Pacemaker     Pt has d/t sick sinus syndrome    Pulmonary nodule     Sick sinus syndrome (HCC)      Past Surgical History:   Procedure Laterality Date    CARDIAC PACEMAKER PLACEMENT      COLONOSCOPY      EGD      HEMORRHOID SURGERY      HERNIA REPAIR      inguinal hernia    [place] THYROID LOBECTOMY,UNILAT Left 12/22/2021    Procedure: THYROIDECTOMY WITH ISTHMUSECTOMY; POSSIBLE TOTAL THYROIDECTOMY; FROZEN SECTION ANALYSIS; [facility] MONITOR SET-UP AND USE; Surgeon: Francia Pinon MD;  Location: [facility];  Service: ENT    ROTATOR CUFF REPAIR Right      Social History   Social History     Substance and Sexual Activity   Alcohol Use Yes    Alcohol/week: 1 0 standard drink    Types: 1 Glasses of wine per week    Comment: Pt has an occasional glass of wine     Social History     Substance and Sexual Activity   Drug Use Not Currently     Social History     Tobacco Use   Smoking Status Never Smoker   Smokeless Tobacco Never Used     Family History:   Family History   Problem Relation Age of Onset    Thyroid disease unspecified Neg Hx        Meds/Allergies   all current active meds have been reviewed, current meds:   Current Facility-Administered Medications   Medication Dose Route Frequency    acetaminophen (TYLENOL) tablet 975 mg  975 mg Oral Q8H [facility]    [START ON 6/11/2022] enoxaparin (LOVENOX) subcutaneous injection 30 mg  30 mg Subcutaneous A91Q [facility]    folic acid (FOLVITE) tablet 1 mg  1 mg Oral Daily    gabapentin (NEURONTIN) capsule 100 mg  100 mg Oral HS    heparin (porcine) subcutaneous injection 5,000 Units  5,000 Units Subcutaneous Q8H [facility]    HYDROmorphone HCl (DILAUDID) injection 0 2 mg  0 2 mg Intravenous Q4H PRN    levothyroxine tablet 75 mcg  75 mcg Oral Early Morning    lidocaine (LIDODERM) 5 % patch 1 patch  1 patch Topical Daily    multivitamin-minerals (CENTRUM) tablet 1 tablet  1 tablet Oral Daily    ondansetron (ZOFRAN) injection 4 mg  4 mg Intravenous Q6H PRN    oxyCODONE (ROXICODONE) IR tablet 2 5 mg  2 5 mg Oral Q6H PRN    oxyCODONE (ROXICODONE) IR tablet 5 mg  5 mg Oral Q6H PRN    tamsulosin (FLOMAX) capsule 0 4 mg  0 4 mg Oral Daily With Dinner    thiamine tablet 100 mg  100 mg Oral Daily    and PTA meds:   Prior to Admission Medications   Prescriptions Last Dose Informant Patient Reported?  Taking?   tamsulosin (Flomax) 0 4 mg   Yes No Sig: Take 0 4 mg by mouth daily with dinner      Facility-Administered Medications: None       No Known Allergies    Objective   Temp:  [97 4 °F (36 3 °C)-97 8 °F (36 6 °C)] 97 4 °F (36 3 °C)  HR:  [60-78] 60  Resp:  [16-21] 17  BP: ()/() 163/90    Intake/Output Summary (Last 24 hours) at 6/10/2022 1100  Last data filed at 6/10/2022 1001  Gross per 24 hour   Intake 881 66 ml   Output 1000 ml   Net -118 34 ml       Physical Exam  Vitals and nursing note reviewed  Constitutional:       General: He is awake  He is not in acute distress  Appearance: He is not ill-appearing, toxic-appearing or diaphoretic  HENT:      Head:      Comments: Midface ecchymosis and abrasions  Eyes:      Conjunctiva/sclera: Conjunctivae normal       Pupils: Pupils are equal, round, and reactive to light  Cardiovascular:      Rate and Rhythm: Normal rate and regular rhythm  Pulmonary:      Effort: Pulmonary effort is normal       Breath sounds: Normal breath sounds and air entry  Chest:      Chest wall: No deformity, tenderness or crepitus  Abdominal:      Palpations: Abdomen is soft  Tenderness: There is no abdominal tenderness  Skin:     General: Skin is warm and dry  Capillary Refill: Capillary refill takes less than 2 seconds  Neurological:      Mental Status: He is alert and oriented to person, place, and time  GCS: GCS eye subscore is 4  GCS verbal subscore is 5  GCS motor subscore is 6  Psychiatric:         Attention and Perception: Attention normal          Speech: Speech normal          Behavior: Behavior normal  Behavior is cooperative  Lab Results:   I have personally reviewed pertinent labs  , CBC:   Lab Results   Component Value Date    WBC 8 84 06/10/2022    HGB 13 0 06/10/2022    HCT 39 2 06/10/2022    MCV 90 06/10/2022     06/10/2022    MCH 29 7 06/10/2022    MCHC 33 2 06/10/2022    RDW 13 9 06/10/2022    MPV 9 5 06/10/2022    NRBC 0 06/10/2022   , CMP:   Lab Results   Component Value Date    SODIUM 129 (L) 06/10/2022    K 4 0 06/10/2022    CL 98 (L) 06/10/2022    CO2 21 06/10/2022    BUN 8 06/10/2022    CREATININE 0 84 06/10/2022    CALCIUM 8 1 (L) 06/10/2022    AST 20 06/09/2022    ALT 18 06/09/2022    ALKPHOS 55 06/09/2022    EGFR 76 06/10/2022   , BMP:  Lab Results   Component Value Date    SODIUM 129 (L) 06/10/2022    K 4 0 06/10/2022    CL 98 (L) 06/10/2022    CO2 21 06/10/2022    BUN 8 06/10/2022    CREATININE 0 84 06/10/2022    GLUC 99 06/10/2022    CALCIUM 8 1 (L) 06/10/2022    AGAP 10 06/10/2022    EGFR 76 06/10/2022   , PT/PTT:  Lab Results   Component Value Date    PTT 27 06/09/2022    Estimated Creatinine Clearance: 53 6 mL/min (by C-G formula based on SCr of 0 84 mg/dL)  Imaging Studies: I have personally reviewed pertinent reports  EKG, Pathology, and Other Studies: I have personally reviewed pertinent reports  Counseling / Coordination of Care  Greater than 50% of total time was spent with the patient and / or family counseling and / or coordination of care  A description of the counseling / coordination of care:  Patient interview, physical examination, review of PDMP, review of medical record, review of imaging and laboratory data, development of pain management plan, discussion of pain management plan with patient and primary service  Please note that the APS provides consultative services regarding pain management only  With the exception of ketamine and epidural infusions and except when indicated, final decisions regarding starting or changing doses of analgesic medications are at the discretion of the consulting service  Off hours consultation and/or medication management is generally not available      Zafar Kee PA-C  Acute Pain Service

## 2022-06-11 ENCOUNTER — TELEPHONE (OUTPATIENT)
Dept: OTHER | Facility: HOSPITAL | Age: 87
End: 2022-06-11

## 2022-06-11 VITALS
HEIGHT: 65 IN | TEMPERATURE: 97.9 F | BODY MASS INDEX: 27.88 KG/M2 | DIASTOLIC BLOOD PRESSURE: 60 MMHG | SYSTOLIC BLOOD PRESSURE: 130 MMHG | OXYGEN SATURATION: 99 % | HEART RATE: 62 BPM | RESPIRATION RATE: 18 BRPM

## 2022-06-11 LAB
ANION GAP SERPL CALCULATED.3IONS-SCNC: 7 MMOL/L (ref 4–13)
BASOPHILS # BLD AUTO: 0.02 THOUSANDS/ΜL (ref 0–0.1)
BASOPHILS NFR BLD AUTO: 0 % (ref 0–1)
BUN SERPL-MCNC: 10 MG/DL (ref 5–25)
CALCIUM SERPL-MCNC: 8.6 MG/DL (ref 8.3–10.1)
CHLORIDE SERPL-SCNC: 102 MMOL/L (ref 100–108)
CO2 SERPL-SCNC: 25 MMOL/L (ref 21–32)
CREAT SERPL-MCNC: 0.96 MG/DL (ref 0.6–1.3)
EOSINOPHIL # BLD AUTO: 0.1 THOUSAND/ΜL (ref 0–0.61)
EOSINOPHIL NFR BLD AUTO: 2 % (ref 0–6)
ERYTHROCYTE [DISTWIDTH] IN BLOOD BY AUTOMATED COUNT: 14.1 % (ref 11.6–15.1)
GFR SERPL CREATININE-BSD FRML MDRD: 68 ML/MIN/1.73SQ M
GLUCOSE SERPL-MCNC: 96 MG/DL (ref 65–140)
HCT VFR BLD AUTO: 40.1 % (ref 36.5–49.3)
HGB BLD-MCNC: 13.7 G/DL (ref 12–17)
IMM GRANULOCYTES # BLD AUTO: 0.03 THOUSAND/UL (ref 0–0.2)
IMM GRANULOCYTES NFR BLD AUTO: 1 % (ref 0–2)
LYMPHOCYTES # BLD AUTO: 1.23 THOUSANDS/ΜL (ref 0.6–4.47)
LYMPHOCYTES NFR BLD AUTO: 20 % (ref 14–44)
MCH RBC QN AUTO: 29.9 PG (ref 26.8–34.3)
MCHC RBC AUTO-ENTMCNC: 34.2 G/DL (ref 31.4–37.4)
MCV RBC AUTO: 88 FL (ref 82–98)
MONOCYTES # BLD AUTO: 0.55 THOUSAND/ΜL (ref 0.17–1.22)
MONOCYTES NFR BLD AUTO: 9 % (ref 4–12)
NEUTROPHILS # BLD AUTO: 4.16 THOUSANDS/ΜL (ref 1.85–7.62)
NEUTS SEG NFR BLD AUTO: 68 % (ref 43–75)
NRBC BLD AUTO-RTO: 0 /100 WBCS
PLATELET # BLD AUTO: 203 THOUSANDS/UL (ref 149–390)
PMV BLD AUTO: 9.4 FL (ref 8.9–12.7)
POTASSIUM SERPL-SCNC: 4.5 MMOL/L (ref 3.5–5.3)
RBC # BLD AUTO: 4.58 MILLION/UL (ref 3.88–5.62)
SODIUM SERPL-SCNC: 134 MMOL/L (ref 136–145)
WBC # BLD AUTO: 6.09 THOUSAND/UL (ref 4.31–10.16)

## 2022-06-11 PROCEDURE — NC001 PR NO CHARGE: Performed by: PHYSICIAN ASSISTANT

## 2022-06-11 PROCEDURE — 99238 HOSP IP/OBS DSCHRG MGMT 30/<: CPT | Performed by: PHYSICIAN ASSISTANT

## 2022-06-11 PROCEDURE — 80048 BASIC METABOLIC PNL TOTAL CA: CPT | Performed by: PHYSICIAN ASSISTANT

## 2022-06-11 PROCEDURE — 85025 COMPLETE CBC W/AUTO DIFF WBC: CPT | Performed by: PHYSICIAN ASSISTANT

## 2022-06-11 RX ORDER — FINASTERIDE 5 MG/1
5 TABLET, FILM COATED ORAL DAILY
Qty: 25 TABLET | Refills: 0 | Status: SHIPPED | OUTPATIENT
Start: 2022-06-12 | End: 2022-07-07

## 2022-06-11 RX ADMIN — THIAMINE HCL TAB 100 MG 100 MG: 100 TAB at 08:13

## 2022-06-11 RX ADMIN — FOLIC ACID 1 MG: 1 TABLET ORAL at 08:13

## 2022-06-11 RX ADMIN — FINASTERIDE 5 MG: 5 TABLET, FILM COATED ORAL at 08:13

## 2022-06-11 RX ADMIN — LEVOTHYROXINE SODIUM 75 MCG: 75 TABLET ORAL at 05:19

## 2022-06-11 RX ADMIN — ACETAMINOPHEN 975 MG: 325 TABLET ORAL at 05:19

## 2022-06-11 RX ADMIN — ENOXAPARIN SODIUM 30 MG: 30 INJECTION SUBCUTANEOUS at 08:13

## 2022-06-11 RX ADMIN — Medication 1 TABLET: at 08:13

## 2022-06-11 NOTE — INCIDENTAL FINDINGS
The following findings require follow up:  Radiographic finding   Findin  Severe prostamegaly protruding into the bladder base  2  There are gallstone(s) within the gallbladder  3  3 mm right upper lobe calcified granuloma  Follow up required: Yes   Follow up should be done within 2-4 week(s)    Please notify the following clinician to assist with the follow up:   Primary Care Provider  Discussed with patient at bedside and physical copy given to patient at discharge  Will follow-up with Primary care provider in two weeks

## 2022-06-11 NOTE — DISCHARGE SUMMARY
Discharge Summary - Andra De La Torre 80 y o  male MRN: [id number]    Unit/Bed#: [facility] 169-95 Encounter: [id number]    Admission Date:   Admission Orders (From admission, onward)     Ordered        06/09/22 2331  Inpatient Admission  Once                        Admitting Diagnosis: Trauma [T14 90XA]  Closed fracture of nasal bone, initial encounter [S02  2XXA]  Fall, initial encounter [id number]  XXXA]  Closed fracture of multiple ribs of right side, initial encounter [S22 41XA]    HPI: Per Harmeet Pate, "Andra De La Torre is a 80 y o  male with PMHx of BPH, sick sinus syndrome s/p pacemaker placement, who initially presented to [facility] ED after a fall at a bar this evening  Patient was drinking at a bar when he tripped and fell in the parking lot landing on his right side  + headstrike, no LOC  ED workup revealed bilateral nasal bone fractures and rib fractures  He was transferred to [facility] for trauma evaluation and management  Upon arrival he denies any complaints  He is slow to respond and difficult to awake "    Procedures Performed: No orders of the defined types were placed in this encounter  Summary of Hospital Course:  Patient is a 70-year-old male comes in with significant past medical history of BPH, sick sinus syndrome status post pacemaker placement who fell  Patient was intoxicated at a bar  Initial labs revealed some hyponatremia however this resolved and he was back at his baseline  He was also noted to have a nasal bone fracture as well as acute versus chronic rib fractures  He had no pain within his ribs  APS signed off  OMFS do not recommend any operative intervention  He was also noted to have some hematuria during his hospital course after a straight catheterization  Urology was consulted recommended outpatient follow-up  He was able to urinate without any hematuria for total of 24 hours  His bladder scans were stable  He will be discharged on Proscar  Scripts sent to pharmacy    Instructions reviewed with patient at bedside  He was able to repeat instructions back to provider  Significant Findings, Care, Treatment and Services Provided:   CT chest abdomen pelvis wo contrast    Result Date: 6/9/2022  Impression: Acute mildly displaced fractures of the anterolateral right 4th through 7th ribs  I personally discussed this study with Belinda Washburndmont on 6/9/2022 at 6:51 PM  Workstation performed: [id number]     XR Trauma chest portable    Result Date: 6/9/2022  Impression: No acute cardiopulmonary disease  (Please see subsequent CT, where multiple right-sided rib fractures are visible ) Workstation performed: [id number]     XR chest pa & lateral    Result Date: 6/10/2022  Impression: Right anterolateral 4th-7th rib fractures, grossly unchanged in alignment  No pneumothorax  Workstation performed: [id number]     XR hand 3+ views LEFT    Result Date: 6/10/2022  Impression: No acute osseous abnormality  Workstation performed: [id number]     XR hand 3+ vw right    Result Date: 6/10/2022  Impression: No acute osseous abnormality  Workstation performed: [id number]     TRAUMA - CT head wo contrast    Result Date: 6/9/2022  Impression: No acute intracranial abnormality  Nasal bone fractures  See the CT facial bone report  Workstation performed: [id number]     TRAUMA - CT facial bones wo contrast    Result Date: 6/9/2022  Impression: Mildly displaced acute bilateral nasal bone fractures  Workstation performed: [id number]     TRAUMA - CT spine cervical wo contrast    Result Date: 6/9/2022  Impression: No cervical spine fracture or traumatic malalignment  Workstation performed: [id number]     XR Trauma pelvis ap only 1 or 2 vw    Result Date: 6/9/2022  Impression: No acute osseous abnormality   Workstation performed: [id number]       Complications:  No complications    Discharge Diagnosis:   Patient Active Problem List   Diagnosis    Hyperlipidemia    Pacemaker    Gout    Pulmonary nodule    Nontoxic single thyroid nodule    BPH (benign prostatic hyperplasia)    Closed fracture of nasal bone    Closed fracture of multiple ribs of right side    Alcohol abuse    Fall    Hematuria    History of hypothyroidism         Medical Problems             Resolved Problems  Date Reviewed: 6/11/2022   None                 Condition at Discharge: good         Discharge instructions/Information to patient and family:   See after visit summary for information provided to patient and family  Provisions for Follow-Up Care:  See after visit summary for information related to follow-up care and any pertinent home health orders  PCP: Emiliana Burgess DO    Disposition: Home    Planned Readmission: No      Discharge Statement   I spent 22 minutes discharging the patient  This time was spent on the day of discharge  I had direct contact with the patient on the day of discharge  Additional documentation is required if more than 30 minutes were spent on discharge  Discharge Medications:  See after visit summary for reconciled discharge medications provided to patient and family

## 2022-06-11 NOTE — CASE MANAGEMENT
Case Management Discharge Planning Note    Patient name Scott Chung  Location [facility] 615/[facility] 833-81 MRN [id number]  : 1930 Date 2022       Current Admission Date: 2022  Current Admission Diagnosis:Closed fracture of nasal bone   Patient Active Problem List    Diagnosis Date Noted    Closed fracture of nasal bone 06/10/2022    Closed fracture of multiple ribs of right side 06/10/2022    Alcohol abuse 06/10/2022    Fall 06/10/2022    Hematuria 06/10/2022    History of hypothyroidism 06/10/2022    BPH (benign prostatic hyperplasia) 2021    Pacemaker     Gout     Pulmonary nodule     Nontoxic single thyroid nodule     Hyperlipidemia       LOS (days): 2  Geometric Mean LOS (GMLOS) (days): 2 90  Days to GMLOS:1 4     OBJECTIVE:  Risk of Unplanned Readmission Score: 9 88         Current admission status: Inpatient   Preferred Pharmacy:   [address] [place] [address] [address] [facility] [address] [place] [address],[address]  [address] [facility] [address]  Phone: [phone] Fax: [phone]    Primary Care Provider: Jerome Joaquin DO    Primary Insurance: MEDICARE  Secondary Insurance:     DISCHARGE DETAILS:    Discharge planning discussed with[de-identified] patient at bedside  Freedom of Choice: Yes  Comments - Freedom of Choice: Recommendations for STR discussed with patient at bedside  Patient refusing IP STR and [address] therapy at this time  Were Treatment Team discharge recommendations reviewed with patient/caregiver?: Yes  Did patient/caregiver verbalize understanding of patient care needs?: Yes  Were patient/caregiver advised of the risks associated with not following Treatment Team discharge recommendations?: Yes  DME Referral Provided  Referral made for DME?: No (patient informed he has access to a RW if needed  Pt declined need for RW )    Other Referral/Resources/Interventions Provided:  Referral Comments: patient refusing STR and HHC therapy at this time   Pt reports he will require lyft ride at d/c and is comfortable getting in and out of car independently, as patient has been ambulating throughout the halls  independently  Transport waiver signed  Copy placed in medical records bin  Pt reports no further CM needs at this time      Treatment Team Recommendation: Home  Discharge Destination Plan[de-identified] Home  Transport at Discharge : Free Local Transportation  Dispatcher Contacted: Yes  Number/Name of Dispatcher: [facility]  Transported by [employer] and Unit #): amanda  ETA of Transport (Date): 06/11/22  ETA of Transport (Time): 1300

## 2022-06-11 NOTE — PROGRESS NOTES
[address] [facility]  Progress Note Adrienne Galindo 9/25/1930, 80 y o  male MRN: [id number]  Unit/Bed#: [facility] 205-10 Encounter: [id number]  Primary Care Provider: Salvador Dc DO   Date and time admitted to hospital: 6/9/2022 10:56 PM    History of hypothyroidism  Assessment & Plan  - Continue current medication regimen   - Outpatient follow-up with PCP  Hematuria  Assessment & Plan  - possibly related to during catheterization  - will follow up Urology recommendations  - CT scan reviewed with attending  - monitor urinary output   - urology to evaluate in a m  Fall  Assessment & Plan  - Status post fall with the below noted injuries  - Fall precautions  - Geriatric Medicine consultation for evaluation, medication review and recommendations   - PT and OT evaluation and treatment as indicated  - Case Management consultation for disposition planning  Alcohol abuse  Assessment & Plan  - CIWA protocol  - Case Management consultation  - Follow-up recs    Closed fracture of multiple ribs of right side  Assessment & Plan  - Multiple right-sided rib fractures, present on admission   - Continue rib fracture protocol   - Continue to encourage incentive spirometer use and adequate pulmonary hygiene  Currently pulling 1000 mL on I S  - Continue multimodal analgesic regimen  Appreciate APS evaluation and recommendations   - Supplemental oxygen via nasal cannula as needed to maintain saturations greater than or equal to 94%  - PT and OT evaluation and treatment as indicated  - Outpatient follow-up in the trauma clinic for re-evaluation in approximately 2 weeks        BPH (benign prostatic hyperplasia)  Assessment & Plan  - Discontinue flomax and start finasteride    * Closed fracture of nasal bone  Assessment & Plan  - OMFS consulted, appreciate input  - No further work-up at this time  - Outpatient follow-up  - Sinus precautions  - no abx at this time    DVT prophylaxis:  SCDs and Lovenox  PT and OT:  Recommended to home    Disposition:  Possible DC home today  Follow up Urology recommendations  No other workup at this time  Labs reviewed  SUBJECTIVE:  Chief Complaint: "I want to go home "    Subjective:  Patient offering no new complaints today on presentation  Reports that he would like to go home  Discussed the possibility of home health resources however he is refusing at this time  He does not wish to go to any facility  He is up and ambulatory in the hallway and doing well  Does not have any ambulatory dysfunction while walking  He has a GCS of 15  He is answering all questions appropriately  He has insight to his current diagnoses as well as understanding of the incidental findings which were previously discussed prior to his discharge  OBJECTIVE:   Vitals:   Temp:  [97 3 °F (36 3 °C)-97 8 °F (36 6 °C)] 97 8 °F (36 6 °C)  HR:  [58-70] 58  Resp:  [18] 18  BP: (120-141)/(61-85) 141/85    Intake/Output:  I/O       06/09 0701  06/10 0700 06/10 0701 06/11 0700 06/11 0701  06/12 0700    P  O  100      I V  393 3 388  3     Total Intake 493 3 388  3     Urine 1000 700     Total Output 1000 700     Net -506 7 -311 7                 Nutrition: Diet Regular; Regular House    Physical Exam:   GENERAL APPEARANCE:  No acute distress  NEURO:  GCS 15  HEENT:  Facial abrasions and ecchymosis appreciated on periorbital region as well as the nasal bridge  CV:  Regular rate and rhythm  LUNGS:  CTA bilaterally  GI:  Nontender, nondistended  :  No Alberto  MSK:  +2 pulses on extremities  SKIN:  Warm, dry, intact    Invasive Devices  Report    Peripheral Intravenous Line  Duration           Peripheral IV 06/09/22 Left Forearm 1 day          Drain  Duration           Closed/Suction Drain Anterior;Right Neck 7 Fr  170 days                 PIC Score  PIC Pain Score: 3 (6/11/2022  1:00 AM)  PIC Incentive Spirometry Score: Goal to alert volume (6/10/2022  7:28 PM)  PIC Cough Description: Strong (6/10/2022  7:28 PM)  PIC Total Score: 9 (6/10/2022  7:28 PM)       If the Total PIC Score </=5, did you consult APS and evaluate patient for further intervention?: yes      Pain:    Incentive Spirometry  Cough  3 = Controlled  4 = Above goal volume 3 = Strong  2 = Moderate  3 = Goal to alert volume 2 = Weak  1 = Severe  2 = Below alert volume 1 = Absent     1 = Unable to perform IS         Lab Results:   Results: I have personally reviewed all pertinent laboratory/tests results, BMP/CMP:   Lab Results   Component Value Date    SODIUM 134 (L) 06/11/2022    K 4 5 06/11/2022     06/11/2022    CO2 25 06/11/2022    BUN 10 06/11/2022    CREATININE 0 96 06/11/2022    CALCIUM 8 6 06/11/2022    EGFR 68 06/11/2022    and CBC:   Lab Results   Component Value Date    WBC 6 09 06/11/2022    HGB 13 7 06/11/2022    HCT 40 1 06/11/2022    MCV 88 06/11/2022     06/11/2022    MCH 29 9 06/11/2022    MCHC 34 2 06/11/2022    RDW 14 1 06/11/2022    MPV 9 4 06/11/2022    NRBC 0 06/11/2022     Imaging/EKG Studies: I have personally reviewed pertinent reports       Other Studies:  No other studies

## 2022-06-11 NOTE — TELEPHONE ENCOUNTER
Anjel Jack is a 80-year-old male seen in consultation at [facility] for urinary retention and hematuria  He will require outpatient visits for PVR symptom assessment with AP in to discuss clinical benefit cystoscopy if indicated  Should be seen in approximately 4--6 weeks  Thank you

## 2022-06-11 NOTE — PLAN OF CARE
Problem: PAIN - ADULT  Goal: Verbalizes/displays adequate comfort level or baseline comfort level  Description: Interventions:  - Encourage patient to monitor pain and request assistance  - Assess pain using appropriate pain scale  - Administer analgesics based on type and severity of pain and evaluate response  - Implement non-pharmacological measures as appropriate and evaluate response  - Consider cultural and social influences on pain and pain management  - Notify physician/advanced practitioner if interventions unsuccessful or patient reports new pain  Outcome: Progressing     Problem: INFECTION - ADULT  Goal: Absence or prevention of progression during hospitalization  Description: INTERVENTIONS:  - Assess and monitor for signs and symptoms of infection  - Monitor lab/diagnostic results  - Monitor all insertion sites, i e  indwelling lines, tubes, and drains  - Monitor endotracheal if appropriate and nasal secretions for changes in amount and color  - [place] appropriate cooling/warming therapies per order  - Administer medications as ordered  - Instruct and encourage patient and family to use good hand hygiene technique  - Identify and instruct in appropriate isolation precautions for identified infection/condition  Outcome: Progressing  Goal: Absence of fever/infection during neutropenic period  Description: INTERVENTIONS:  - Monitor WBC    Outcome: Progressing     Problem: SAFETY ADULT  Goal: Patient will remain free of falls  Description: INTERVENTIONS:  - Educate patient/family on patient safety including physical limitations  - Instruct patient to call for assistance with activity   - Consult OT/PT to assist with strengthening/mobility   - Keep Call bell within reach  - Keep bed low and locked with side rails adjusted as appropriate  - Keep care items and personal belongings within reach  - Initiate and maintain comfort rounds  - Make Fall Risk Sign visible to staff  - Apply yellow socks and bracelet for high fall risk patients  - Consider moving patient to room near nurses station  Outcome: Progressing  Goal: Maintain or return to baseline ADL function  Description: INTERVENTIONS:  -  Assess patient's ability to carry out ADLs; assess patient's baseline for ADL function and identify physical deficits which impact ability to perform ADLs (bathing, care of mouth/teeth, toileting, grooming, dressing, etc )  - Assess/evaluate cause of self-care deficits   - Assess range of motion  - Assess patient's mobility; develop plan if impaired  - Assess patient's need for assistive devices and provide as appropriate  - Encourage maximum independence but intervene and supervise when necessary  - Involve family in performance of ADLs  - Assess for home care needs following discharge   - Consider OT consult to assist with ADL evaluation and planning for discharge  - Provide patient education as appropriate  Outcome: Progressing  Goal: Maintains/Returns to pre admission functional level  Description: INTERVENTIONS:  - Perform BMAT or MOVE assessment daily    - Set and communicate daily mobility goal to care team and patient/family/caregiver     - Collaborate with rehabilitation services on mobility goals if consulted  - Out of bed for toileting  - Record patient progress and toleration of activity level   Outcome: Progressing     Problem: DISCHARGE PLANNING  Goal: Discharge to home or other facility with appropriate resources  Description: INTERVENTIONS:  - Identify barriers to discharge w/patient and caregiver  - Arrange for needed discharge resources and transportation as appropriate  - Identify discharge learning needs (meds, wound care, etc )  - Arrange for interpretive services to assist at discharge as needed  - Refer to Case Management Department for coordinating discharge planning if the patient needs post-hospital services based on physician/advanced practitioner order or complex needs related to functional status, cognitive ability, or social support system  Outcome: Progressing     Problem: Knowledge Deficit  Goal: Patient/family/caregiver demonstrates understanding of disease process, treatment plan, medications, and discharge instructions  Description: Complete learning assessment and assess knowledge base  Interventions:  - Provide teaching at level of understanding  - Provide teaching via preferred learning methods  Outcome: Progressing     Problem: NEUROSENSORY - ADULT  Goal: Achieves stable or improved neurological status  Description: INTERVENTIONS  - Monitor and report changes in neurological status  - Monitor vital signs such as temperature, blood pressure, glucose, and any other labs ordered   - Initiate measures to prevent increased intracranial pressure  - Monitor for seizure activity and implement precautions if appropriate      Outcome: Progressing  Goal: Remains free of injury related to seizures activity  Description: INTERVENTIONS  - Maintain airway, patient safety  and administer oxygen as ordered  - Monitor patient for seizure activity, document and report duration and description of seizure to physician/advanced practitioner  - If seizure occurs,  ensure patient safety during seizure  - Reorient patient post seizure  - Seizure pads on all 4 side rails  - Instruct patient/family to notify RN of any seizure activity including if an aura is experienced  - Instruct patient/family to call for assistance with activity based on nursing assessment  - Administer anti-seizure medications if ordered    Outcome: Progressing  Goal: Achieves maximal functionality and self care  Description: INTERVENTIONS  - Monitor swallowing and airway patency with patient fatigue and changes in neurological status  - Encourage and assist patient to increase activity and self care     - Encourage visually impaired, hearing impaired and aphasic patients to use assistive/communication devices  Outcome: Progressing     Problem: RESPIRATORY - ADULT  Goal: Achieves optimal ventilation and oxygenation  Description: INTERVENTIONS:  - Assess for changes in respiratory status  - Assess for changes in mentation and behavior  - Position to facilitate oxygenation and minimize respiratory effort  - Oxygen administered by appropriate delivery if ordered  - Initiate smoking cessation education as indicated  - Encourage broncho-pulmonary hygiene including cough, deep breathe, Incentive Spirometry  - Assess the need for suctioning and aspirate as needed  - Assess and instruct to report SOB or any respiratory difficulty  - Respiratory Therapy support as indicated  Outcome: Progressing     Problem: GENITOURINARY - ADULT  Goal: Maintains or returns to baseline urinary function  Description: INTERVENTIONS:  - Assess urinary function  - Encourage oral fluids to ensure adequate hydration if ordered  - Administer IV fluids as ordered to ensure adequate hydration  - Administer ordered medications as needed  - Offer frequent toileting  - Follow urinary retention protocol if ordered  Outcome: Progressing  Goal: Absence of urinary retention  Description: INTERVENTIONS:  - Assess patients ability to void and empty bladder  - Monitor I/O  - Bladder scan as needed  - Discuss with physician/AP medications to alleviate retention as needed  - Discuss catheterization for long term situations as appropriate  Outcome: Progressing  Goal: Urinary catheter remains patent  Description: INTERVENTIONS:  - Assess patency of urinary catheter  - If patient has a chronic laurent, consider changing catheter if non-functioning  - Follow guidelines for intermittent irrigation of non-functioning urinary catheter  Outcome: Progressing

## 2022-06-21 LAB
ATRIAL RATE: 64 BPM
QRS AXIS: -86 DEGREES
QRSD INTERVAL: 182 MS
QT INTERVAL: 488 MS
QTC INTERVAL: 491 MS
T WAVE AXIS: 91 DEGREES
VENTRICULAR RATE: 61 BPM

## 2022-08-05 RX ORDER — CELECOXIB 100 MG/1
CAPSULE ORAL
COMMUNITY
Start: 2021-11-03

## 2022-08-05 RX ORDER — TAMSULOSIN HYDROCHLORIDE 0.4 MG/1
0.4 CAPSULE ORAL EVERY OTHER DAY
COMMUNITY

## 2022-08-05 RX ORDER — NAPROXEN 500 MG/1
TABLET ORAL
COMMUNITY
Start: 2022-05-23

## 2022-08-05 RX ORDER — LEVOTHYROXINE SODIUM 0.07 MG/1
TABLET ORAL
COMMUNITY
Start: 2022-03-29

## 2022-08-08 ENCOUNTER — OFFICE VISIT (OUTPATIENT)
Dept: UROLOGY | Facility: CLINIC | Age: 87
End: 2022-08-08
Payer: MEDICARE

## 2022-08-08 VITALS
OXYGEN SATURATION: 99 % | HEIGHT: 64 IN | SYSTOLIC BLOOD PRESSURE: 120 MMHG | DIASTOLIC BLOOD PRESSURE: 68 MMHG | WEIGHT: 158 LBS | HEART RATE: 84 BPM | BODY MASS INDEX: 26.98 KG/M2

## 2022-08-08 DIAGNOSIS — R31.0 GROSS HEMATURIA: ICD-10-CM

## 2022-08-08 DIAGNOSIS — N40.1 BENIGN PROSTATIC HYPERPLASIA WITH URINARY FREQUENCY: Primary | ICD-10-CM

## 2022-08-08 DIAGNOSIS — R35.0 BENIGN PROSTATIC HYPERPLASIA WITH URINARY FREQUENCY: Primary | ICD-10-CM

## 2022-08-08 LAB
SL AMB  POCT GLUCOSE, UA: ABNORMAL
SL AMB LEUKOCYTE ESTERASE,UA: ABNORMAL
SL AMB POCT BILIRUBIN,UA: ABNORMAL
SL AMB POCT BLOOD,UA: ABNORMAL
SL AMB POCT CLARITY,UA: CLEAR
SL AMB POCT COLOR,UA: ABNORMAL
SL AMB POCT KETONES,UA: ABNORMAL
SL AMB POCT NITRITE,UA: ABNORMAL
SL AMB POCT PH,UA: 5.5
SL AMB POCT SPECIFIC GRAVITY,UA: 1.01
SL AMB POCT URINE PROTEIN: ABNORMAL
SL AMB POCT UROBILINOGEN: 0.2

## 2022-08-08 PROCEDURE — 81002 URINALYSIS NONAUTO W/O SCOPE: CPT

## 2022-08-08 PROCEDURE — 99213 OFFICE O/P EST LOW 20 MIN: CPT

## 2022-08-08 NOTE — PROGRESS NOTES
8/8/2022    No chief complaint on file  Assessment and Plan    80 y o  male new patient to office    1  BPH with LUTS  · Acute urinary retention during hospitalization in June following traumatic fall  · PVR 61 mL  · Resolved  · Continue tamsulosin 0 4 mg   · Follow-up in 1 year    2  Gross hematuria  · Likely secondary to straight catheterization during hospitalization  · Denies recurrent episodes of gross hematuria  · Urine dip in office positive for trace blood, negative leukocytes or nitrates  · Send for UA/micro        History of Present Illness  Tammy Alberto is a 80 y o  male here for hospital follow-up evaluation of [place] retention and gross hematuria  He was seen in consultation during a hospitalization in June  Patient had a traumatic fall requiring transfer to [facility]  During his hospitalization he developed urinary retention and was straight catheterized for 600 cc  Subsequently he developed gross hematuria following straight catheterization  It was recommended he started proscar and caution use of flomax due to concern for falls  He continues to take flomax every other day, and does not take proscar  He is currently testing to see how his urinary habits are with taking flomax every other day  He reports occasional urinary frequency and weak urinary stream that fluctuates  He primarily drinks 4 cups of coffee per day, 5 glasses of water occasional soda  He is satisfied with his urinary habits  He denies difficulty urinating or sensation of incomplete bladder emptying  Denies fever, chills, gross hematuria, abdominal pain, or flank pain  Review of Systems   Constitutional: Negative for chills and fever  HENT: Negative for congestion and sore throat  Respiratory: Negative for cough and shortness of breath  Cardiovascular: Negative for chest pain and leg swelling  Gastrointestinal: Negative for abdominal pain, constipation, diarrhea, nausea and vomiting     Genitourinary: Negative for decreased urine volume, difficulty urinating, dysuria, flank pain, frequency, hematuria, testicular pain and urgency  Musculoskeletal: Negative for back pain and gait problem  Skin: Negative for wound  Allergic/Immunologic: Negative for immunocompromised state  Neurological: Negative for dizziness, seizures, weakness and numbness  Hematological: Does not bruise/bleed easily  Vitals  Vitals:    08/08/22 0937   BP: 120/68   BP Location: Left arm   Patient Position: Sitting   Pulse: 84   SpO2: 99%   Weight: 71 7 kg (158 lb)   Height: 5' 4" (1 626 m)       Physical Exam  Vitals reviewed  Constitutional:       General: He is not in acute distress  Appearance: Normal appearance  He is not ill-appearing or toxic-appearing  HENT:      Head: Normocephalic and atraumatic  Eyes:      General: No scleral icterus  Conjunctiva/sclera: Conjunctivae normal    Cardiovascular:      Rate and Rhythm: Normal rate  Pulses: Normal pulses  Pulmonary:      Effort: Pulmonary effort is normal  No respiratory distress  Abdominal:      General: Abdomen is flat  Palpations: Abdomen is soft  Tenderness: There is no abdominal tenderness  There is no right CVA tenderness or left CVA tenderness  Hernia: No hernia is present  Musculoskeletal:      Cervical back: Normal range of motion  Right lower leg: No edema  Left lower leg: No edema  Skin:     General: Skin is warm and dry  Coloration: Skin is not jaundiced or pale  Neurological:      General: No focal deficit present  Mental Status: He is alert and oriented to person, place, and time  Mental status is at baseline  Gait: Gait normal    Psychiatric:         Mood and Affect: Mood normal          Behavior: Behavior normal          Thought Content:  Thought content normal          Judgment: Judgment normal          Past History  Past Medical History:   Diagnosis Date    Anemia     BPH (benign prostatic hyperplasia)     Cancer ([address] )     malignant neoplasm of thyroid gland    Gout     Hyperlipidemia     Pacemaker     Pt has d/t sick sinus syndrome    Pulmonary nodule     Sick sinus syndrome (HCC)      Social History     Socioeconomic History    Marital status:      Spouse name: None    Number of children: None    Years of education: None    Highest education level: None   Occupational History    None   Tobacco Use    Smoking status: Never Smoker    Smokeless tobacco: Never Used   Vaping Use    Vaping Use: Never used   Substance and Sexual Activity    Alcohol use: Yes     Alcohol/week: 1 0 standard drink     Types: 1 Glasses of wine per week     Comment: Pt has an occasional glass of wine    Drug use: Not Currently    Sexual activity: None     Comment: did not ask   Other Topics Concern    None   Social History Narrative    None     Social Determinants of Health     Financial Resource Strain: Not on file   Food Insecurity: Not on file   Transportation Needs: Not on file   Physical Activity: Not on file   Stress: Not on file   Social Connections: Not on file   Intimate Partner Violence: Not on file   Housing Stability: Not on file     Social History     Tobacco Use   Smoking Status Never Smoker   Smokeless Tobacco Never Used     Family History   Problem Relation Age of Onset    Thyroid disease unspecified Neg Hx        The following portions of the patient's history were reviewed and updated as appropriate allergies, current medications, past medical history, past social history, past surgical history and problem list    Imagin/09/2022  CT CHEST, ABDOMEN AND PELVIS WITHOUT IV CONTRAST     INDICATION:   trauma      COMPARISON:  AP chest from earlier today      TECHNIQUE: CT examination of the chest, abdomen and pelvis was performed without intravenous contrast  This examination was performed without intravenous contrast in the context of the critical nationwide Omnipaque shortage  Axial, sagittal, and coronal   2D reformatted images were created from the source data and submitted for interpretation      Radiation dose length product (DLP) for this visit:  836 mGy-cm   This examination, like all CT scans performed in the [facility], was performed utilizing techniques to minimize radiation dose exposure, including the use of iterative   reconstruction and automated exposure control       Enteric contrast was administered       FINDINGS:     CHEST     LUNGS:  No pulmonary contusion      Mild biapical pleural/parenchymal scarring  3 mm right upper lobe calcified granuloma      PLEURA:  Unremarkable      HEART/GREAT VESSELS: Cardiomegaly  No thoracic aortic aneurysm  Coronary artery calcifications  No pericardial effusion      MEDIASTINUM AND JANUARY:  Unremarkable      CHEST WALL AND LOWER NECK:  Left anterior chest wall dual-lead cardiac device      ABDOMEN     LIVER/BILIARY TREE:  Unremarkable      GALLBLADDER:  There are gallstone(s) within the gallbladder, without pericholecystic inflammatory changes      SPLEEN:  Unremarkable      PANCREAS:  Unremarkable      ADRENAL GLANDS:  Unremarkable      KIDNEYS/URETERS:  Unremarkable  No hydronephrosis      STOMACH AND BOWEL:  There is colonic diverticulosis without evidence of acute diverticulitis      APPENDIX:  No findings to suggest appendicitis      ABDOMINOPELVIC CAVITY:  No ascites  No pneumoperitoneum  No lymphadenopathy      VESSELS:  Unremarkable for patient's age      PELVIS     REPRODUCTIVE ORGANS:  Severe prostamegaly protruding into the bladder base      URINARY BLADDER:  Unremarkable      ABDOMINAL WALL/INGUINAL REGIONS:  Unremarkable      OSSEOUS STRUCTURES:  Acute mildly displaced fractures of the anterolateral right 4th through 7th ribs      IMPRESSION:     Acute mildly displaced fractures of the anterolateral right 4th through 7th ribs      Results  No results found for this or any previous visit (from the past 1 hour(s))  ]  No results found for: PSA  Lab Results   Component Value Date    CALCIUM 8 6 06/11/2022    K 4 5 06/11/2022    CO2 25 06/11/2022     06/11/2022    BUN 10 06/11/2022    CREATININE 0 96 06/11/2022     Lab Results   Component Value Date    WBC 6 09 06/11/2022    HGB 13 7 06/11/2022    HCT 40 1 06/11/2022    MCV 88 06/11/2022     06/11/2022       Please Note:  Voice dictation software has been used to create this document  There may be inadvertent transcriptions errors       Katie Chaidez

## 2022-08-25 NOTE — PHYSICAL THERAPY NOTE
PHYSICAL THERAPY EVALUATION  NAME:  Sue Barger  DATE: 06/10/22    AGE:   80 y o  Mrn:   [id number]  ADMIT DX:  Trauma [T14 90XA]  Closed fracture of nasal bone, initial encounter [S02  2XXA]  Fall, initial encounter [id number]  XXXA]  Closed fracture of multiple ribs of right side, initial encounter [S22 41XA]    Past Medical History:   Diagnosis Date    Anemia     BPH (benign prostatic hyperplasia)     Cancer (HCC)     malignant neoplasm of thyroid gland    Gout     Hyperlipidemia     Pacemaker     Pt has d/t sick sinus syndrome    Pulmonary nodule     Sick sinus syndrome (HCC)        Past Surgical History:   Procedure Laterality Date    CARDIAC PACEMAKER PLACEMENT      COLONOSCOPY      EGD      HEMORRHOID SURGERY      HERNIA REPAIR      inguinal hernia    NM THYROID LOBECTOMY,UNILAT Left 12/22/2021    Procedure: THYROIDECTOMY WITH ISTHMUSECTOMY; POSSIBLE TOTAL THYROIDECTOMY; FROZEN SECTION ANALYSIS; NIM MONITOR SET-UP AND USE; Surgeon: Shruthi Mccoy MD;  Location:  MAIN OR;  Service: ENT    ROTATOR CUFF REPAIR Right        Length Of Stay: 1    PHYSICAL THERAPY EVALUATION:        06/10/22 1135   Note Type   Note type Evaluation   Pain Assessment   Pain Assessment Tool 0-10   Pain Score No Pain   Restrictions/Precautions   Weight Bearing Precautions Per Order No   Other Precautions Chair Alarm; Bed Alarm;Cognitive;Multiple lines; Fall Risk;Hard of hearing   Home Living   Type of [address] One level  (0 MELISSA)   Home Equipment   (none as per pt)   Additional Comments Patient reports living alone, but states he has local friends who can assist him if needed   Prior Function   Level of [place] Independent with ADLs and functional mobility   Lives With Alone   Receives Help From Friend(s)   ADL Assistance Independent   Falls in the last 6 months 1 to 4  (1 as per pt)   Comments Patient denies use of an assistive device for ambulation prior to admission   General   Family/Caregiver Present No   Cognition   Overall Cognitive Status Impaired   Arousal/Participation Alert   Orientation Level Oriented to person;Oriented to place;Oriented to situation   Memory Decreased recall of precautions;Decreased recall of recent events   Following Commands Follows one step commands with increased time or repetition   RUE Assessment   RUE Assessment WFL   LUE Assessment   LUE Assessment WFL   RLE Assessment   RLE Assessment WFL   Strength RLE   RLE Overall Strength 4/5   LLE Assessment   LLE Assessment WFL   Strength LLE   LLE Overall Strength 4/5   Bed Mobility   Supine to Sit 4  Minimal assistance   Additional items Assist x 1; Increased time required;Verbal cues   Transfers   Sit to Stand 4  Minimal assistance   Additional items Assist x 1; Increased time required;Verbal cues   Stand to Sit 4  Minimal assistance   Additional items Assist x 1; Increased time required;Verbal cues   Additional Comments VC and TC needed for hand placement and safety   Ambulation/Elevation   Gait pattern Short stride; Foward flexed; Inconsistent dulce; Excessively slow   Gait Assistance 4  Minimal assist   Additional items Assist x 1   Assistive Device Rolling walker   Distance 35ft x 2   Balance   Static Sitting Fair   Static Standing Fair -   Ambulatory Poor +   Endurance Deficit   Endurance Deficit Yes   Endurance Deficit Description fatigue   Activity Tolerance   Activity Tolerance Patient limited by fatigue   Medical Staff Made Aware Tayler, OT; OT present for co evaluation due to pts current medical presentation   Nurse Made Aware Patient appropriate to be seen and mobilized per nursing   Assessment   Prognosis Good   Problem List Decreased strength;Decreased endurance;Decreased mobility; Impaired balance;Decreased cognition; Impaired judgement;Decreased safety awareness; Impaired hearing   Assessment Pt is 80 y o  male seen for PT evaluation s/p admit to [facility] on 6/9/2022  Two pt identifiers were used to confirm  Pt presented s/p fall a bar  Patient originally presented at [address] [place] [address] and was transferred to [facility] for further medical management/ evaluation  Pt was ad mitted with a primary dx of:  Multiple right rib fractures, bilateral nasal bone fractures  PT now consulted for assessment of mobility and d/c needs  Pt with Up in chair orders  Pts current co morbidities affecting treatment include: Anemia, cancer, HLD, pacemaker, sick sinus syndrome, benign prostatic hyperplasia, and personal factors including living alone  Pts current clinical presentation is Unstable/ Unpredictable (high complexity) due to Ongoing medical management for primary dx, Increased reliance on more restrictive AD compared to baseline, Decreased activity tolerance compared to baseline, Fall risk, Increased assistance needed from caregiver at current time, Cog status, Continuous pulse oximetry monitoring     Upon evaluation, pt currently is requiring Min Ax1 for bed mobility; Min Ax1 for transfers and Min Ax1 for ambulation w/ RW   Pt required frequent cues for safety during ambulation  Pt presents at PT eval functioning below baseline and currently w/ overall mobility deficits 2* to: BLE weakness, impaired balance, decreased endurance, gait deviations, decreased activity tolerance compared to baseline, decreased safety awareness, impaired judgement, fall risk, decreased cognition  Pt currently at a fall risk 2* to impairments listed above  Based on the aforementioned PT evaluation, pt will continue to benefit from skilled Acute PT interventions to address stated impairments; to maximize functional mobility; for ongoing pt/ family training; and DME needs  At conclusion of PT session pt returned back in chair and chair alarm engaged with phone and call bell within reach  Pt denies any further questions at this time  PT is currently recommending Rehab  PT will continue to follow during hospital stay     Barriers to Discharge Decreased caregiver support Goals   Patient Goals " to go home"   [facility] Expiration Date 06/20/22   Short Term Goal #1 In 10 days pt will complete: 1) Bed mobility skills with mod I to increase safety and independence as well as decrease caregiver burden  2) Functional transfers with mod I to promote increased independence, safety, and QOL  3) Ambulate 200' using least restrictive AD with mod I without LOB and stable vitals so that pt can negotiate previous living environment safely and promote independence with functional mobility and return to PLOF  4) Improve balance grades by 1/2 grade to increase safety with all mobility and decrease fall risk  5) Improve BLE strength by 1/2 grade to help increase overall functional mobility and decrease fall risk  Plan   Treatment/Interventions Functional transfer training;LE strengthening/ROM; Therapeutic exercise; Endurance training;Patient/family training;Equipment eval/education; Bed mobility;Gait training;Spoke to nursing;OT   PT Frequency Other (Comment)  (3-6x week)   Recommendation   PT Discharge Recommendation Post acute rehabilitation services   Equipment Recommended [facility] Recommended Wheeled walker   AM-PAC Basic Mobility Inpatient   Turning in Bed Without Bedrails 3   Lying on Back to Sitting on Edge of Flat Bed 3   Moving Bed to Chair 3   Standing Up From Chair 3   Walk in Room 3   Climb 3-5 Stairs 2   Basic Mobility Inpatient Raw Score 17   Basic Mobility Standardized Score 39 67   Highest Level Of Mobility   -HLM Goal 5: Stand one or more mins   JH-HLM Achieved 7: Walk 25 feet or more   Modified Roya Scale   Modified Roya Scale 4   Barthel Index   Feeding 10   Bathing 0   Grooming Score 5   Dressing Score 5   Bladder Score 10   Bowels Score 10   Toilet Use Score 5   Transfers (Bed/Chair) Score 10   Mobility (Level Surface) Score 0   Stairs Score 0   Barthel Index Score 55   Portions of the documentation may have been created using voice recognition software  Occasional wrong word or sound alike substitutions may have occurred due to the inherent limitations of the voice recognition software  Read the chart carefully and recognize, using context, where substitutions have occurred      Jenni Guido, PT, DPT Expiration Date (Optional): 7/31/24

## 2022-08-31 ENCOUNTER — TELEPHONE (OUTPATIENT)
Dept: UROLOGY | Facility: CLINIC | Age: 87
End: 2022-08-31

## 2022-08-31 NOTE — TELEPHONE ENCOUNTER
Called Pt, NO answer  Mail box full  We need to schedule 1 year follow for 8/2023 with Sharath Vargas

## 2023-05-09 ENCOUNTER — TELEPHONE (OUTPATIENT)
Dept: UROLOGY | Facility: MEDICAL CENTER | Age: 88
End: 2023-05-09

## 2023-05-09 NOTE — TELEPHONE ENCOUNTER
PCP office called to schedule an appt for elevated PSA 17 28     Please review     Pt can be reached at [phone]

## 2023-05-09 NOTE — TELEPHONE ENCOUNTER
Tried calling patient back to either schedule later today with Dr Anisa Navas or tomorrow with one of the providers    Mailbox is full unable to leave message

## 2023-05-21 PROBLEM — N40.1 BENIGN PROSTATIC HYPERPLASIA WITH LOWER URINARY TRACT SYMPTOMS: Status: ACTIVE | Noted: 2021-12-22

## 2023-05-21 PROBLEM — R97.20 ELEVATED PSA: Status: ACTIVE | Noted: 2023-05-21

## 2023-05-22 NOTE — PROGRESS NOTES
UROLOGY PROGRESS NOTE         NAME: Ml Burkett  AGE: 80 y o  SEX: male  : 1930   MRN: [id number]    DATE: 2023  TIME: 8:30 AM    Assessment and Plan   Procedures     Impression:   1  Benign prostatic hyperplasia with lower urinary tract symptoms, symptom details unspecified  -     POCT urine dip auto non-scope  -     POCT Measure PVR    2  Elevated PSA    3  Urinary frequency         Plan: Options regarding his elevated PSA include observation and at his age else what I would recommend  Other options include a multiparametric MRI or prostate biopsy  Risk and benefits explained to patient he elects for  Observation with repeat PSA in 6 months followed by a yearly exam     Certainly if the PSA would continue to rise patient may be leaning more towards an imaging test or possible biopsy  For the time being he will continue Flomax and Proscar  Patient seems satisfied with his office visit  Chief Complaint     Chief Complaint   Patient presents with   • Follow-up     Pt is not reporting any problems at this time     History of Present Illness     HPI: Ml Burkett is a 80y o  year old male who presents with follow-up from [facility] office visit 2022 on Flomax for BPH  Patient also on finasteride according to the med rec    Patient developed acute urinary retention during his hospitalization in 2022 from a traumatic fall but that had resolved  It was noted his PVR was 61 in the office  According to care everywhere the patient had a PSA on May 9, 2023 the PSA was 17 6  Patient denies any hematuria  States he has a good flow  Does complain of some urinary frequency is on Lasix  Denies any weight loss no bone pain          The following portions of the patient's history were reviewed and updated as appropriate: allergies, current medications, past family history, past medical history, past social history, past surgical history and problem list   Past Medical History:   Diagnosis Date "  • Anemia    • BPH (benign prostatic hyperplasia)    • Cancer (HCC)     malignant neoplasm of thyroid gland   • Gout    • Hyperlipidemia    • Pacemaker     Pt has d/t sick sinus syndrome   • Pulmonary nodule    • Sick sinus syndrome [facility])      Past Surgical History:   Procedure Laterality Date   • CARDIAC PACEMAKER PLACEMENT     • COLONOSCOPY     • EGD     • HEMORRHOID SURGERY     • HERNIA REPAIR      inguinal hernia   • IA TOTAL THYROID LOBECTOMY UNI W/WO ISTHMUSECTOMY Left 12/22/2021    Procedure: THYROIDECTOMY WITH ISTHMUSECTOMY; POSSIBLE TOTAL THYROIDECTOMY; FROZEN SECTION ANALYSIS; NIM MONITOR SET-UP AND USE; Surgeon: Neeru Morgan MD;  Location:  MAIN OR;  Service: ENT   • ROTATOR CUFF REPAIR Right      shoulder  Review of Systems     Const: Denies chills, fever and weight loss  CV: Denies chest pain  Resp: Denies SOB  GI: Denies abdominal pain, nausea and vomiting  : Denies symptoms other than stated above  Musculo: Denies back pain  Objective   /80 (BP Location: Left arm)   Pulse 68   Temp 98 4 °F (36 9 °C)   Ht 5' 4\" (1 626 m)   Wt 72 2 kg (159 lb 3 2 oz)   SpO2 99%   BMI 27 33 kg/m²     Physical Exam  Const: Appears healthy and well developed  No signs of acute distress present  Resp: Respirations are regular and unlabored  CV: Rate is regular  Rhythm is regular  Abdomen: Abdomen is soft, nontender, and nondistended  Kidneys are not palpable  : He had a normal external genital exam prostate was enlarged but smooth no masses or nodules  Psych: Patient's attitude is cooperative   Mood is normal  Affect is normal     Current Medications     Current Outpatient Medications:   •  allopurinol (ZYLOPRIM) 100 mg tablet, Take 100 mg by mouth daily, Disp: , Rfl:   •  apixaban (ELIQUIS) 5 mg, Take 5 mg by mouth 2 (two) times a day, Disp: , Rfl:   •  furosemide (LASIX) 20 mg tablet, Take 40 mg by mouth, Disp: , Rfl:   •  tamsulosin (FLOMAX) 0 4 mg, Take 0 4 mg by mouth every other day, " Disp: , Rfl:   •  celecoxib (CeleBREX) 100 mg capsule, Take by mouth (Patient not taking: Reported on 5/26/2023), Disp: , Rfl:   •  finasteride (PROSCAR) 5 mg tablet, Take 1 tablet (5 mg total) by mouth daily for 25 days, Disp: 25 tablet, Rfl: 0  •  levothyroxine 75 mcg tablet, Take by mouth (Patient not taking: Reported on 5/26/2023), Disp: , Rfl:   •  naproxen (EC NAPROSYN) 500 MG EC tablet, TAKE ONE TABLET BY MOUTH TWICE DAILY AS NEEDED  CAUTION WITH GI UPSET (Patient not taking: Reported on 5/26/2023), Disp: , Rfl:   •  predniSONE 10 mg tablet, Take 10 mg by mouth daily (Patient not taking: Reported on 5/26/2023), Disp: , Rfl:   •  silver sulfadiazine (SILVADENE,SSD) 1 % cream, Apply 1 application   topically daily (Patient not taking: Reported on 5/26/2023), Disp: , Rfl:         Jayden Beebe MD

## 2023-05-23 RX ORDER — PREDNISONE 10 MG/1
10 TABLET ORAL DAILY
COMMUNITY
Start: 2023-02-23

## 2023-05-23 RX ORDER — FUROSEMIDE 20 MG/1
40 TABLET ORAL
COMMUNITY
Start: 2023-05-16 | End: 2024-05-15

## 2023-05-23 RX ORDER — ALLOPURINOL 100 MG/1
100 TABLET ORAL DAILY
COMMUNITY
Start: 2023-03-13

## 2023-05-26 ENCOUNTER — OFFICE VISIT (OUTPATIENT)
Dept: UROLOGY | Facility: CLINIC | Age: 88
End: 2023-05-26

## 2023-05-26 VITALS
DIASTOLIC BLOOD PRESSURE: 80 MMHG | HEART RATE: 68 BPM | BODY MASS INDEX: 27.18 KG/M2 | TEMPERATURE: 98.4 F | WEIGHT: 159.2 LBS | OXYGEN SATURATION: 99 % | HEIGHT: 64 IN | SYSTOLIC BLOOD PRESSURE: 126 MMHG

## 2023-05-26 DIAGNOSIS — R97.20 ELEVATED PSA: ICD-10-CM

## 2023-05-26 DIAGNOSIS — N40.1 BENIGN PROSTATIC HYPERPLASIA WITH LOWER URINARY TRACT SYMPTOMS, SYMPTOM DETAILS UNSPECIFIED: Primary | ICD-10-CM

## 2023-05-26 DIAGNOSIS — R35.0 URINARY FREQUENCY: ICD-10-CM

## 2024-12-02 ENCOUNTER — TELEPHONE (OUTPATIENT)
Dept: UROLOGY | Facility: CLINIC | Age: 89
End: 2024-12-02

## 2024-12-02 DIAGNOSIS — R97.20 ELEVATED PSA: Primary | ICD-10-CM

## 2024-12-02 NOTE — TELEPHONE ENCOUNTER
Called and unable to leave message due to the mailbox was full. Patient is in need of a PSA level and also a yearly follow up. Please relay message to patient if he would return the call to the office, also assist with scheduling.

## 2025-05-13 ENCOUNTER — TELEPHONE (OUTPATIENT)
Dept: UROLOGY | Facility: CLINIC | Age: OVER 89
End: 2025-05-13

## 2025-05-13 PROBLEM — Z87.448 HISTORY OF HEMATURIA: Status: ACTIVE | Noted: 2025-05-13

## 2025-05-13 NOTE — TELEPHONE ENCOUNTER
Unable to leave message    Left message for pt to call office back to complete psa testing for upcoming appt. Order in. Please find out where pt would like to have testing done and fax if going other then [facility].

## 2025-05-13 NOTE — PROGRESS NOTES
UROLOGY PROGRESS NOTE         NAME: Chito Alaniz  AGE: 89 y.o. SEX: male  : 1930   MRN: [id number]    DATE: 2025  TIME: 2:59 PM    Assessment and Plan   Procedures     Impression:   1. Benign prostatic hyperplasia with lower urinary tract symptoms, symptom details unspecified  2. Elevated PSA  3. History of hematuria       Plan: We rediscussed with the patient regarding his PSA of 2 years ago.  We gave him the option of observation and continuing Flomax Proscar versus rechecking a PSA to check his trend.  He would like to repeat the PSA.    My recommendation strongly is if his PSA is relatively unchanged from 2 years ago and given his age I would do observation.  Certainly if his PSA doubles or is higher may want to consider imaging with an MP MRI.  Clearly based on his age that would be the patient's decision.  Patient will continue Flomax and Proscar    Chief Complaint   No chief complaint on file.    History of Present Illness     HPI: Chito Alaniz is a 89 y.o. year old male who presents with follow-up office visit last seen by me on 2023.  Patient is seeing the nurse practitioner in [facility] in  on Flomax for BPH.  Patient was also on finasteride.  History of urinary retention eventually resolved.    From care everywhere patient's PSA on May 9, 2023 was 17.6.  He states he had a good flow but did complain of urinary frequency on Lasix per my note in May 2023.  Follow-up regarding his PSA options included MP MRI versus biopsy versus observation.  Patient elected for observation with PSA in 6 months.  Patient was to continue Flomax and Proscar.  Patient did not follow-up.  Patient is on Eliquis.  Will have patient if possible get PSA prior to appointment.  Currently patient on finasteride and Flomax doing relatively well.  No hematuria no dysuria.  Feels he is emptying okay.  Average flow.  He states his symptoms are better when he takes his allopurinol.      The following  portions of the patient's history were reviewed and updated as appropriate: allergies, current medications, past family history, past medical history, past social history, past surgical history and problem list.  Past Medical History:   Diagnosis Date    Anemia     BPH (benign prostatic hyperplasia)     Cancer (HCC)     malignant neoplasm of thyroid gland    Gout     Hyperlipidemia     Pacemaker     Pt has d/t sick sinus syndrome    Pulmonary nodule     Sick sinus syndrome (HCC)      Past Surgical History:   Procedure Laterality Date    CARDIAC PACEMAKER PLACEMENT      COLONOSCOPY      EGD      HEMORRHOID SURGERY      HERNIA REPAIR      inguinal hernia    ND TOTAL THYROID LOBECTOMY UNI W/WO ISTHMUSECTOMY Left 12/22/2021    Procedure: THYROIDECTOMY WITH ISTHMUSECTOMY; POSSIBLE TOTAL THYROIDECTOMY; FROZEN SECTION ANALYSIS; [facility] MONITOR SET-UP AND USE;  Surgeon: Kevin Daigle MD;  Location: [facility];  Service: ENT    ROTATOR CUFF REPAIR Right      shoulder  Review of Systems     Const: Denies chills, fever and weight loss.  CV: Denies chest pain.  Resp: Denies SOB.  GI: Denies abdominal pain, nausea and vomiting.  : Denies symptoms other than stated above.  Musculo: Denies back pain.    Objective   There were no vitals taken for this visit.    Physical Exam  Const: Appears healthy and well developed. No signs of acute distress present.  Resp: Respirations are regular and unlabored.   CV: Rate is regular. Rhythm is regular.  Abdomen: Abdomen is soft, nontender, and nondistended. Kidneys are not palpable.  : PVR 22 normal external genitalia exam, prostate enlarged smooth benign feeling no masses or nodules  Psych: Patient's attitude is cooperative. Mood is normal. Affect is normal.    Current Medications     Current Outpatient Medications:     allopurinol (ZYLOPRIM) 100 mg tablet, Take 100 mg by mouth daily, Disp: , Rfl:     apixaban (ELIQUIS) 5 mg, Take 5 mg by mouth 2 (two) times a day, Disp: , Rfl:     celecoxib  (CeleBREX) 100 mg capsule, Take by mouth (Patient not taking: Reported on 5/26/2023), Disp: , Rfl:     finasteride (PROSCAR) 5 mg tablet, Take 1 tablet (5 mg total) by mouth daily for 25 days, Disp: 25 tablet, Rfl: 0    furosemide (LASIX) 20 mg tablet, Take 40 mg by mouth, Disp: , Rfl:     levothyroxine 75 mcg tablet, Take by mouth (Patient not taking: Reported on 5/26/2023), Disp: , Rfl:     naproxen (EC NAPROSYN) 500 MG EC tablet, TAKE ONE TABLET BY MOUTH TWICE DAILY AS NEEDED. CAUTION WITH GI UPSET (Patient not taking: Reported on 5/26/2023), Disp: , Rfl:     predniSONE 10 mg tablet, Take 10 mg by mouth daily (Patient not taking: Reported on 5/26/2023), Disp: , Rfl:     tamsulosin (FLOMAX) 0.4 mg, Take 0.4 mg by mouth every other day, Disp: , Rfl:         Artemio Dias MD

## 2025-05-22 RX ORDER — CLOBETASOL PROPIONATE 0.5 MG/G
CREAM TOPICAL
COMMUNITY
Start: 2025-04-14

## 2025-05-23 ENCOUNTER — APPOINTMENT (OUTPATIENT)
Dept: LAB | Facility: HOSPITAL | Age: OVER 89
End: 2025-05-23
Attending: UROLOGY
Payer: MEDICARE

## 2025-05-23 ENCOUNTER — OFFICE VISIT (OUTPATIENT)
Dept: UROLOGY | Facility: CLINIC | Age: OVER 89
End: 2025-05-23
Payer: MEDICARE

## 2025-05-23 VITALS
HEIGHT: 64 IN | BODY MASS INDEX: 27.14 KG/M2 | DIASTOLIC BLOOD PRESSURE: 88 MMHG | HEART RATE: 71 BPM | SYSTOLIC BLOOD PRESSURE: 142 MMHG | TEMPERATURE: 97.4 F | OXYGEN SATURATION: 99 % | WEIGHT: 159 LBS

## 2025-05-23 DIAGNOSIS — N40.1 BENIGN PROSTATIC HYPERPLASIA WITH LOWER URINARY TRACT SYMPTOMS, SYMPTOM DETAILS UNSPECIFIED: Primary | ICD-10-CM

## 2025-05-23 DIAGNOSIS — R97.20 ELEVATED PSA: ICD-10-CM

## 2025-05-23 DIAGNOSIS — C73 MALIGNANT TUMOR OF THYROID GLAND (HCC): ICD-10-CM

## 2025-05-23 DIAGNOSIS — Z87.448 HISTORY OF HEMATURIA: ICD-10-CM

## 2025-05-23 LAB
POST-VOID RESIDUAL VOLUME, ML POC: 23 ML
PSA SERPL-MCNC: 11.67 NG/ML (ref 0–4)
SL AMB  POCT GLUCOSE, UA: NORMAL
SL AMB LEUKOCYTE ESTERASE,UA: NORMAL
SL AMB POCT BILIRUBIN,UA: NORMAL
SL AMB POCT BLOOD,UA: NORMAL
SL AMB POCT CLARITY,UA: CLEAR
SL AMB POCT COLOR,UA: YELLOW
SL AMB POCT KETONES,UA: NORMAL
SL AMB POCT NITRITE,UA: NORMAL
SL AMB POCT PH,UA: 6.5
SL AMB POCT SPECIFIC GRAVITY,UA: 1.01
SL AMB POCT URINE PROTEIN: NORMAL
SL AMB POCT UROBILINOGEN: 1

## 2025-05-23 PROCEDURE — 84153 ASSAY OF PSA TOTAL: CPT

## 2025-05-23 PROCEDURE — 81003 URINALYSIS AUTO W/O SCOPE: CPT | Performed by: UROLOGY

## 2025-05-23 PROCEDURE — 99214 OFFICE O/P EST MOD 30 MIN: CPT | Performed by: UROLOGY

## 2025-05-23 PROCEDURE — 36415 COLL VENOUS BLD VENIPUNCTURE: CPT

## 2025-05-23 PROCEDURE — 51798 US URINE CAPACITY MEASURE: CPT | Performed by: UROLOGY

## 2025-05-25 ENCOUNTER — RESULTS FOLLOW-UP (OUTPATIENT)
Dept: UROLOGY | Facility: CLINIC | Age: OVER 89
End: 2025-05-25

## 2025-05-27 NOTE — TELEPHONE ENCOUNTER
----- Message from Artemio Dias MD sent at 5/25/2025  6:03 AM EDT -----  Please call the patient and let him know his PSA has improved from a year and a half ago from 16 down to 11 and I think that is fine for his age and recommend observation and follow-up with me in 1   year with a repeat exam and PSA.  Patient does not hear well so please speak louder.    If patient has any questions let him know when I am back in the office happy to speak to him  ----- Message -----  From: Lab, Background User  Sent: 5/23/2025   6:04 PM EDT  To: Artemio Dias MD

## (undated) DEVICE — DRAPE EQUIPMENT RF WAND

## (undated) DEVICE — PACK UNIVERSAL NECK

## (undated) DEVICE — NEEDLE 25G X 1 1/2

## (undated) DEVICE — INTENDED FOR TISSUE SEPARATION, AND OTHER PROCEDURES THAT REQUIRE A SHARP SURGICAL BLADE TO PUNCTURE OR CUT.: Brand: BARD-PARKER SAFETY BLADES SIZE 15, STERILE

## (undated) DEVICE — ADHESIVE SKIN CLSR DERMABOND NX

## (undated) DEVICE — TUBING SUCTION 5MM X 12 FT

## (undated) DEVICE — LIGACLIP MCA MULTIPLE CLIP APPLIERS, 20 SMALL CLIPS: Brand: LIGACLIP

## (undated) DEVICE — BIPOLAR CORD DISP

## (undated) DEVICE — JACKSON-PRATT 100CC BULB RESERVOIR: Brand: CARDINAL HEALTH

## (undated) DEVICE — TIBURON SPLIT SHEET: Brand: CONVERTORS

## (undated) DEVICE — MEDI-VAC YANK SUCT HNDL W/TPRD BULBOUS TIP: Brand: CARDINAL HEALTH

## (undated) DEVICE — SUT CHROMIC 4-0 RB-1 27 IN U203H

## (undated) DEVICE — EMG TUBE 8229707 NIM TRIVANTAGE 7.0MM ID: Brand: NIM TRIVANTAGE™

## (undated) DEVICE — GLOVE INDICATOR PI UNDERGLOVE SZ 8 BLUE

## (undated) DEVICE — PROBE PTEYE-1 FIBER OPTIC: Brand: PTEYE

## (undated) DEVICE — SUT ETHILON 2-0 PS 18 IN 585H

## (undated) DEVICE — LIGACLIP MCA MULTIPLE CLIP APPLIERS, 20 MEDIUM CLIPS: Brand: LIGACLIP

## (undated) DEVICE — 3M™ TEGADERM™ TRANSPARENT FILM DRESSING FRAME STYLE, 1624W, 2-3/8 IN X 2-3/4 IN (6 CM X 7 CM), 100/CT 4CT/CASE: Brand: 3M™ TEGADERM™

## (undated) DEVICE — SUT SILK 2-0 18 IN A185H

## (undated) DEVICE — SUT SILK 2-0 SH 30 IN K833H

## (undated) DEVICE — JP PERF DRN SIL FLT 7MM FULL: Brand: CARDINAL HEALTH

## (undated) DEVICE — UTILITY MARKER,BLACK WITH LABELS: Brand: DEVON

## (undated) DEVICE — PROBE 8225101 5PK STD PRASS FL TIP ROHS

## (undated) DEVICE — PAD GROUNDING ADULT